# Patient Record
Sex: FEMALE | Race: BLACK OR AFRICAN AMERICAN
[De-identification: names, ages, dates, MRNs, and addresses within clinical notes are randomized per-mention and may not be internally consistent; named-entity substitution may affect disease eponyms.]

---

## 2017-03-14 ENCOUNTER — HOSPITAL ENCOUNTER (OUTPATIENT)
Dept: HOSPITAL 56 - MW.CHOBGYN | Age: 22
End: 2017-03-14
Attending: OBSTETRICS & GYNECOLOGY
Payer: COMMERCIAL

## 2017-03-14 DIAGNOSIS — Z34.90: Primary | ICD-10-CM

## 2017-04-10 ENCOUNTER — HOSPITAL ENCOUNTER (OUTPATIENT)
Dept: HOSPITAL 56 - MW.CHOBGYN | Age: 22
End: 2017-04-10
Attending: ADVANCED PRACTICE MIDWIFE
Payer: COMMERCIAL

## 2017-04-10 DIAGNOSIS — A74.9: Primary | ICD-10-CM

## 2017-05-08 ENCOUNTER — HOSPITAL ENCOUNTER (OUTPATIENT)
Dept: HOSPITAL 56 - MW.CHOBGYN | Age: 22
Discharge: HOME | End: 2017-05-08
Attending: ADVANCED PRACTICE MIDWIFE
Payer: COMMERCIAL

## 2017-05-08 DIAGNOSIS — Z34.90: Primary | ICD-10-CM

## 2017-08-02 ENCOUNTER — HOSPITAL ENCOUNTER (EMERGENCY)
Dept: HOSPITAL 56 - MW.ED | Age: 22
Discharge: HOME | End: 2017-08-02
Payer: COMMERCIAL

## 2017-08-02 VITALS — SYSTOLIC BLOOD PRESSURE: 109 MMHG | DIASTOLIC BLOOD PRESSURE: 55 MMHG

## 2017-08-02 DIAGNOSIS — O99.613: Primary | ICD-10-CM

## 2017-08-02 DIAGNOSIS — K03.81: ICD-10-CM

## 2017-08-02 DIAGNOSIS — J45.909: ICD-10-CM

## 2017-08-02 DIAGNOSIS — Z3A.37: ICD-10-CM

## 2017-08-02 DIAGNOSIS — O99.513: ICD-10-CM

## 2017-08-02 DIAGNOSIS — Z91.018: ICD-10-CM

## 2017-08-02 PROCEDURE — 99282 EMERGENCY DEPT VISIT SF MDM: CPT

## 2017-08-02 NOTE — EDM.PDOC
ED HPI GENERAL MEDICAL PROBLEM





- General


Chief Complaint: ENT Problem


Stated Complaint: JAW/TOOTH PAIN


Time Seen by Provider: 08/02/17 07:48





- History of Present Illness


INITIAL COMMENTS - FREE TEXT/NARRATIVE: 





HISTORY AND PHYSICAL:





History of present illness:


The patient is a 22-year-old female with no significant past medical history 

but who is 37 weeks pregnant with her first child and is going to be delivering 

here at our hospital with our nurse midwife; the patient presents with 

complaints of dental pain of one her right upper molar teeth. She says that in 

area has broken off due to a cavity and there is pain there. She is asking for 

a pain shot. She has not talked to her provider about pain management nor has 

she went to see a dentist. She has no facial swelling no sore throat fever 

chills nausea vomiting or other systemic complaints. She's had no vaginal 

bleeding and no abdominal pain contractions or cramping today and the baby has 

been moving.





Review of systems: 


As per history of present illness and below otherwise all systems reviewed and 

negative.





Past medical history: 


As per history of present illness and as reviewed below otherwise 

noncontributory.





Surgical history: 


As per history of present illness and as reviewed below otherwise 

noncontributory.





Social history: 


No reported history of drug or alcohol abuse.





Family history: 


As per history of present illness and as reviewed below otherwise 

noncontributory.





Physical exam:


Gen.: Well-developed well-nourished female who is visibly gravid and nontoxic


HEENT: Atraumatic, normocephalic, pupils reactive, negative for conjunctival 

pallor or scleral icterus, mucous membranes moist, throat clear, neck supple, 

nontender, trachea midline. There is an area at her right upper molar tooth 

that has a small fragment missing and there is minimal swelling in the area but 

no fluctuance and there is tenderness. There is no facial swelling and there is 

some shoddy anterior cervical adenopathy without nuchal rigidity.


Lungs: Clear to auscultation, breath sounds equal bilaterally, chest nontender.


Heart: S1S2, regular rate and rhythm no overt murmurs


Abdomen: Soft, nondistended, nontender. Gravid uterus which is nontender


Pelvis: Deferred


Genitourinary: Deferred.


Rectal: Deferred.


Extremities: Atraumatic, negative for cords or calf pain. Neurovascular 

unremarkable.


Neuro: Awake, alert, oriented. Cranial nerves II through XII unremarkable. 

Cerebellum unremarkable. Motor and sensory unremarkable throughout. Exam 

nonfocal.





Diagnostics:


Fetal heart tones=131





Therapeutics:


Dental balls





I told the patient that I would discuss pain management with the nurse midwife 

as she is close to delivery of this child and I did not want to give her any 

narcotics. She is asking for a "pain shot". I will give her dental balls and 

discuss further management with her provider.


0803: Case was discussed with Steffi Aleman the nurse midwife was aware of 

the case and agrees with the dental balls and amoxicillin prescription and says 

that she can see this patient in her clinic now to further discuss pain 

management and any other concerns.





Impression: 


Dental pain/fracture, third trimester pregnancy stable





Definitive disposition and diagnosis as appropriate pending reevaluation and 

review of above.





- Related Data


 Allergies











Allergy/AdvReac Type Severity Reaction Status Date / Time


 


nuts Allergy  throat Uncoded 02/24/17 22:07





   swelling  











Home Meds: 


 Home Meds





Fluticasone Propionate [Flonase Allergy Relief] 2 spray INH ASDIRECTED PRN 08/07 /16 [History]


Montelukast [Singulair] 10 mg PO ONETIME 08/07/16 [History]











Past Medical History





- Past Health History


Medical/Surgical History: Denies Medical/Surgical History


Other HEENT History: broken/missing teeth


Respiratory History: Reports: Asthma


OB/GYN History: Reports: Pregnancy


Psychiatric History: Reports: None





- Infectious Disease History


Infectious Disease History: Reports: Chicken Pox, Hepatitis B





Social & Family History





- Family History


Family Medical History: Noncontributory





- Tobacco Use


Smoking Status *Q: Never Smoker


Years of Tobacco use: 2


Packs/Tins Daily: 0.1


Second Hand Smoke Exposure: No





- Caffeine Use


Caffeine Use: Reports: Coffee





- Recreational Drug Use


Recreational Drug Use: No





ED ROS GENERAL





- Review of Systems


Review Of Systems: ROS reveals no pertinent complaints other than HPI.





ED EXAM, GENERAL





- Physical Exam


Exam: See Below (See dictation)





Course





- Vital Signs


Last Recorded V/S: 





 Last Vital Signs











Temp  36.6 C   08/02/17 07:45


 


Pulse  80   08/02/17 07:45


 


Resp  20   08/02/17 07:45


 


BP  125/60   08/02/17 07:45


 


Pulse Ox  99   08/02/17 07:45














- Orders/Labs/Meds


Orders: 





 Active Orders 24 hr











 Category Date Time Status


 


 Communication Order [RC] STAT Care  08/02/17 07:53 Ordered


 


 Benzocaine [Hurricaine One 20%] Med  08/02/17 08:00 Once





 2 each MUCMEM ONETIME ONE   


 


 Lidocaine 2% [Xylocaine 2% Viscous] Med  08/02/17 08:00 Once





 15 ml PO ONETIME ONE   














Departure





- Departure


Time of Disposition: 08:07


Disposition: Home, Self-Care 01


Condition: Good


Clinical Impression: 


 Pain, dental, Third trimester pregnancy








- Discharge Information


Forms:  ED Department Discharge


Additional Instructions: 


The following information is given to patients seen in the emergency department 

who are being discharged to home. This information is to outline your options 

for follow-up care. We provide all patients seen in our emergency department 

with a follow-up referral.





The need for follow-up, as well as the timing and circumstances, are variable 

depending upon the specifics of your emergency department visit.





If you don't have a primary care physician on staff, we will provide you with a 

referral. We always advise you to contact your personal physician following an 

emergency department visit to inform them of the circumstance of the visit and 

for follow-up with them and/or the need for any referrals to a consulting 

specialist.





The emergency department will also refer you to a specialist when appropriate. 

This referral assures that you have the opportunity for followup care with a 

specialist. All of these measure are taken in an effort to provide you with 

optimal care, which includes your followup.





Under all circumstances we always encourage you to contact your private 

physician who remains a resource for coordinating  your care. When calling for 

followup care, please make the office aware that this follow-up is from your 

recent emergency room visit. If for any reason you are refused follow-up, 

please contact the [facility] emergency 

department at [phone] and ask to speak to the emergency department 

charge nurse.





[facility]


Primary care-Women's Health


[address] [address] [phone]








You can use the dental balls your given today for pain as shown. A prescription 

for the amoxicillin and please go to the clinic to be seen by the nurse midwife 

now to further discuss pain management. Return to ER as needed and as discussed





- My Orders


Last 24 Hours: 





My Active Orders





08/02/17 07:53


Communication Order [RC] STAT 





08/02/17 08:00


Benzocaine [Hurricaine One 20%]   2 each MUCMEM ONETIME ONE 


Lidocaine 2% [Xylocaine 2% Viscous]   15 ml PO ONETIME ONE 














- Assessment/Plan


Last 24 Hours: 





My Active Orders





08/02/17 07:53


Communication Order [RC] STAT 





08/02/17 08:00


Benzocaine [Hurricaine One 20%]   2 each MUCMEM ONETIME ONE 


Lidocaine 2% [Xylocaine 2% Viscous]   15 ml PO ONETIME ONE

## 2017-08-23 ENCOUNTER — HOSPITAL ENCOUNTER (INPATIENT)
Dept: HOSPITAL 56 - MW.OBCHECK | Age: 22
LOS: 2 days | Discharge: HOME | DRG: 560 | End: 2017-08-25
Attending: OBSTETRICS & GYNECOLOGY | Admitting: OBSTETRICS & GYNECOLOGY
Payer: COMMERCIAL

## 2017-08-23 DIAGNOSIS — Z3A.40: ICD-10-CM

## 2017-08-23 PROCEDURE — 10907ZC DRAINAGE OF AMNIOTIC FLUID, THERAPEUTIC FROM PRODUCTS OF CONCEPTION, VIA NATURAL OR ARTIFICIAL OPENING: ICD-10-PCS | Performed by: OBSTETRICS & GYNECOLOGY

## 2017-08-23 PROCEDURE — 3E033VJ INTRODUCTION OF OTHER HORMONE INTO PERIPHERAL VEIN, PERCUTANEOUS APPROACH: ICD-10-PCS | Performed by: OBSTETRICS & GYNECOLOGY

## 2017-08-23 NOTE — PCM.LDHP
L&D History of Present Illness





- General


Date of Service: 08/23/17


Admit Problem/Dx: 


 Patient Status Order with Admit Dx/Problem





08/23/17 00:49


Patient Status [ADT] Routine 








 Admission Diagnosis/Problem











Admission Diagnosis/Problem    Pregnancy














Source of Information: Patient


History Limitations: Reports: No Limitations





- History of Present Illness


Improves with: Reports: None


Worsens with: Reports: None


Associated Symptoms: Reports: N





- Related Data


Allergies/Adverse Reactions: 


 Allergies











Allergy/AdvReac Type Severity Reaction Status Date / Time


 


nuts Allergy  throat Uncoded 02/24/17 22:07





   swelling  











Home Medications: 


 Home Meds





Fluticasone Propionate [Flonase Allergy Relief] 2 spray INH ASDIRECTED PRN 08/07 /16 [History]


Montelukast [Singulair] 10 mg PO ONETIME 08/07/16 [History]











Past Medical History





- Past Health History


Medical/Surgical History: Denies Medical/Surgical History


Other HEENT History: broken/missing teeth


Cardiovascular History: Reports: None


Respiratory History: Reports: Asthma


OB/GYN History: Reports: Pregnancy, Other (See Below)


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


Musculoskeletal History: Reports: Other (See Below)


Other Musculoskeletal History: Scoliosis


Psychiatric History: Reports: None





- Infectious Disease History


Infectious Disease History: Reports: Chicken Pox, Hepatitis B





Social & Family History





- Family History


Family Medical History: Noncontributory


Cardiac: Reports: Hypertension


: Reports: Dialysis


OBGYN: Reports: Pregnancy


Endocrine/Metabolic: Reports: Diabetes, type II


Oncologic: Reports: Breast





- Tobacco Use


Smoking Status *Q: Never Smoker


Years of Tobacco use: 2


Packs/Tins Daily: 0.1


Second Hand Smoke Exposure: No





- Caffeine Use


Caffeine Use: Reports: None





- Recreational Drug Use


Recreational Drug Use: No





H&P Review of Systems





- Review of Systems:


Review Of Systems: See Below


General: Reports: No Symptoms


HEENT: Reports: No Symptoms


Pulmonary: Reports: No Symptoms


Cardiovascular: Reports: No Symptoms


Gastrointestinal: Reports: No Symptoms


Genitourinary: Reports: No Symptoms


Musculoskeletal: Reports: No Symptoms


Skin: Reports: No Symptoms


Psychiatric: Reports: No Symptoms


Neurological: Reports: No Symptoms


Hematologic/Lymphatic: Reports: No Symptoms


Immunologic: Reports: No Symptoms





L&D Exam





- Exam


Exam: See Below





- Vital Signs


Weight: 67.6 kg





- OB Specific


Fundal Height In cm: 37


Contraction Intensity: Moderate to Strong


Fetal Movement: Active


Fetal Heart Tones: Present


Birth Presentation: Vertex





- Cazares Score


Cazares Score Cervix Position: Anterior


Cazares Score Consistency: Soft


Cazares Score Effacement: 51-70%


Cazares Score Dilation: 3-4 cm


Cazares Score Infant's Station: -2


Cazares Score Total: 9





- Exam


General: Alert, Oriented


HEENT: PERRLA, Conjunctiva Clear, EACs Clear, EOMI, Hearing Intact, Mucosa 

Moist & Pink, Nares Patent, Normal Nasal Septum, Posterior Pharynx Clear, TMs 

Clear


Neck: Supple, Trachea Midline


Lungs: Clear to Auscultation, Normal Respiratory Effort


Cardiovascular: Regular Rate, Regular Rhythm


GI/Abdominal Exam: Normal Bowel Sounds, Soft, Non-Tender, No Organomegaly, No 

Distention, No Abnormal Bruit, No Mass, Pelvis Stable


Rectal Exam: Normal Exam, Normal Rectal Tone


Genitourinary: Normal external exam, Normal bimanual exam, Normal speculum exam


Back Exam: Normal Inspection, Full Range of Motion


Extremities: Normal Inspection, Normal Range of Motion, Non-Tender, No Pedal 

Edema, Normal Capillary Refill


Skin: Warm, Dry, Intact


Neurological: Cranial Nerves Intact, Reflexes Equal Bilateral


Psychiatric: Alert, Normal Affect, Normal Mood





- Patient Data


Lab Results Last 24 hrs: 


 Laboratory Results - last 24 hr











  08/23/17 08/23/17 Range/Units





  01:19 01:19 


 


WBC  12.79 H   (4.0-11.0)  K/uL


 


RBC  3.83 L   (4.30-5.90)  M/uL


 


Hgb  11.1 L   (12.0-16.0)  g/dL


 


Hct  34.5 L   (36.0-46.0)  %


 


MCV  90.1   (80.0-98.0)  fL


 


MCH  29.0   (27.0-32.0)  pg


 


MCHC  32.2   (31.0-37.0)  g/dL


 


RDW Std Deviation  44.9   (28.0-62.0)  fl


 


RDW Coeff of Solange  14   (11.0-15.0)  %


 


Plt Count  211   (150-400)  K/uL


 


MPV  11.10   (7.40-12.00)  fL


 


Blood Type   AB POSITIVE  


 


Antibody Screen   NEGATIVE  











Result Diagrams: 


 08/23/17 01:19





Problem List Initiated/Reviewed/Updated: Yes


Orders Last 24hrs: 


 Active Orders 24 hr











 Category Date Time Status


 


 Patient Status [ADT] Routine ADT  08/23/17 00:49 Active


 


 Bedrest Bathroom Privileges [RC] ASDIRECTED Care  08/23/17 00:49 Active


 


 Communication Order [RC] ASDIRECTED Care  08/23/17 00:49 Active


 


 Communication Order [RC] ASDIRECTED Care  08/23/17 00:49 Active


 


 Communication Order [RC] ASDIRECTED Care  08/23/17 00:49 Active


 


 Fetal Heart Tones [RC] CONTINUOUS Care  08/23/17 00:49 Active


 


 Fetal Non Stress Test [RC] PER UNIT ROUTINE Care  08/23/17 00:49 Active


 


 May Shower [RC] ASDIRECTED Care  08/23/17 00:49 Active


 


 Notify Provider [RC] PRN Care  08/23/17 00:49 Active


 


 Notify Provider [RC] PRN Care  08/23/17 00:49 Active


 


 Notify Provider [RC] PRN Care  08/23/17 00:49 Active


 


 Notify Provider [RC] STAT Care  08/23/17 00:49 Active


 


 Oxygen Therapy [RC] ASDIRECTED Care  08/23/17 00:49 Active


 


 Up ad Estephania [RC] ASDIRECTED Care  08/23/17 00:49 Active


 


 Vaginal Exam [RC] PRN Care  08/23/17 00:49 Active


 


 Vaginal Exam [RC] PRN Care  08/23/17 00:49 Active


 


 Vital Signs [RC] PER UNIT ROUTINE Care  08/23/17 00:49 Active


 


 Vital Signs [RC] PER UNIT ROUTINE Care  08/23/17 00:49 Active


 


 Clear Liquid Diet [DIET] Diet  08/23/17 Breakfast Active


 


 Butorphanol [Stadol] Med  08/23/17 00:49 Active





 1 mg IVPUSH Q1H PRN   


 


 Carboprost Tromethamine [Hemabate DS] Med  08/23/17 00:49 Active





 250 mcg IM ASDIRECTED PRN   


 


 Lactated Ringers [Ringers, Lactated] 1,000 ml Med  08/23/17 01:00 Active





 IV ASDIRECTED   


 


 Lidocaine 1% [Xylocaine 1%] Med  08/23/17 00:49 Active





 50 ml INJECT .ONCE PRN   


 


 Methylergonovine [Methergine] Med  08/23/17 00:49 Active





 0.2 mg IM ASDIRECTED PRN   


 


 Misoprostol [Cytotec] Med  08/23/17 00:49 Active





 200 mcg PO .ONCE PRN   


 


 Misoprostol [Cytotec] Med  08/23/17 01:00 Active





 25 mcg VAG .ONCE   


 


 Misoprostol [Cytotec] Med  08/23/17 00:49 Active





 25 mcg VAG Q4H PRN   


 


 Oxytocin/Lactated Ringers [Pitocin in LR 30 Units/500 Med  08/23/17 01:00 

Active





 ML]   





 30 unit in 500 ml IV TITRATE   


 


 Sodium Chloride 0.9% [Saline Flush] Med  08/23/17 00:49 Active





 10 ml FLUSH ASDIRECTED PRN   


 


 Sodium Chloride 0.9% [Saline Flush] Med  08/23/17 00:49 Active





 2.5 ml FLUSH ASDIRECTED PRN   


 


 Terbutaline [Brethine] Med  08/23/17 00:49 Active





 0.25 mg SUBCUT ASDIRECTED PRN   


 


 Water For Irrigation,Sterile [Sterile Water for Med  08/23/17 00:49 Active





 Irrigation]   





 1,000 ml IRR ASDIRECTED PRN   


 


 Fetal Scalp Electrode [WOMSER] Per Unit Routine Oth  08/23/17 00:49 Ordered


 


 Medication Administration Instruction [OM.PC] Q3H Oth  08/23/17 01:00 Ordered


 


 Peripheral IV Insertion Adult [OM.PC] Routine Oth  08/23/17 00:49 Ordered


 


 Resuscitation Status Routine Resus Stat  08/23/17 00:49 Ordered








 Medication Orders





Butorphanol Tartrate (Stadol)  1 mg IVPUSH Q1H PRN


   PRN Reason: Pain


Carboprost Tromethamine (Hemabate Ds)  250 mcg IM ASDIRECTED PRN


   PRN Reason: Post Partum Hemorrhage


Lactated Ringer's (Ringers, Lactated)  1,000 mls @ 150 mls/hr IV ASDIRECTED NOLBERTO


   Last Admin: 08/23/17 02:04  Dose: 150 mls/hr


   Infusion: 08/23/17 02:04  Dose: 0 mls/hr


   Infusion: 08/23/17 02:04  Dose: 0 mls/hr


   Admin: 08/23/17 01:21  Dose: 150 mls/hr


Oxytocin/Lactated Ringer's (Pitocin In Lr 30 Units/500 Ml)  30 unit in 500 mls 

@ 2 mls/hr IV TITRATE NOLBERTO; 2 MUNITS/MIN


   PRN Reason: Protocol


   Last Titration: 08/23/17 04:34  Dose: 6 munits/min, 6 mls/hr


   Titration: 08/23/17 03:17  Dose: 4 munits/min, 4 mls/hr


   Admin: 08/23/17 02:45  Dose: 2 munits/min, 2 mls/hr


Lidocaine HCl (Xylocaine 1%)  50 ml INJECT .ONCE PRN


   PRN Reason: Laceration repair


Methylergonovine Maleate (Methergine)  0.2 mg IM ASDIRECTED PRN


   PRN Reason: Post Partum Hemorrhage


Misoprostol (Cytotec)  200 mcg PO .ONCE PRN


   PRN Reason: Post Partum Hemorrhage


Misoprostol (Cytotec)  25 mcg VAG .ONCE NOLBERTO


Misoprostol (Cytotec)  25 mcg VAG Q4H PRN


   PRN Reason: Cervical Ripening


   Stop: 08/24/17 04:50


Sodium Chloride (Saline Flush)  10 ml FLUSH ASDIRECTED PRN


   PRN Reason: Keep Vein Open


Sodium Chloride (Saline Flush)  2.5 ml FLUSH ASDIRECTED PRN


   PRN Reason: Keep Vein Open


Sterile Water (Sterile Water For Irrigation)  1,000 ml IRR ASDIRECTED PRN


   PRN Reason: delivery


Terbutaline Sulfate (Brethine)  0.25 mg SUBCUT ASDIRECTED PRN


   PRN Reason: Tacysystole








Assessment/Plan Comment:: 


Term Pregnancy admitted for elective induction she is responding to Pitocin. In 

active labor she can have epidural when she wanted

## 2017-08-24 NOTE — OR
SURGEON:

Nitesh Suh MD

 

DATE OF PROCEDURE:

 

Ms. Goyal is a 22-year-old primigravida.  She is 40+.  She is admitted for an

elective induction.  At the time of admission, she was spontaneously dilated and

she was 3 to 4 cm.  She needed Pitocin augmentation.  The patient continued to

progress well.  She had a variable deceleration throughout the process of labor

with occasional late but later on, the fetal heart tracing straightened out and

it was category 1.  The patient had epidural anesthesia for labor analgesia.

She had an artificial rupture of the membrane at 4 cm and clear fluid.  The

patient continued to progress and she became complete-complete and she was able

to accomplish a normal spontaneous vaginal delivery of male fetus.  Apgar score

reported to be 8 and 9.  The weight is not available.  The placenta delivered

spontaneous, complete, and intact.  The perineum was intact.  There is no

perineal or labial laceration.

 

ESTIMATED BLOOD LOSS:

250 to 300 mL in this birth.

 

There was no complication.

 

 

LANA / YUE

DD:  2017 21:11:00

DT:  2017 01:09:13

Job #:  [id number]

## 2017-08-24 NOTE — PCM.PNPP
- General Info


Date of Service: 17


Functional Status: Reports: Pain Controlled





- Review of Systems


General: Reports: No Symptoms


HEENT: Reports: No Symptoms


Pulmonary: Reports: No Symptoms


Cardiovascular: Reports: No Symptoms


Gastrointestinal: Reports: No Symptoms


Genitourinary: Reports: No Symptoms


Musculoskeletal: Reports: No Symptoms


Skin: Reports: No Symptoms


Neurological: Reports: No Symptoms


Psychiatric: Reports: No Symptoms





- General Info


Date of Service: 17





- Patient Data


Weight - Most Recent: 67.6 kg


Med Orders - Current: 


 Current Medications





Acetaminophen (Tylenol Extra Strength)  500 mg PO Q4H PRN


   PRN Reason: Pain


Acetaminophen (Tylenol Extra Strength)  1,000 mg PO Q4H PRN


   PRN Reason: Pain


Benzocaine/Menthol (Dermoplast Pain Relief 20%-0.5% Spray)  78 gm TOP 

ASDIRECTED PRN


   PRN Reason: Perineal Comfort Measure


Bisacodyl (Dulcolax)  10 mg RECTAL .ONCE PRN


   PRN Reason: Constipation


Butorphanol Tartrate (Stadol)  1 mg IVPUSH Q1H PRN


   PRN Reason: Pain


Carboprost Tromethamine (Hemabate Ds)  250 mcg IM ASDIRECTED PRN


   PRN Reason: Post Partum Hemorrhage


Docusate Sodium (Colace)  100 mg PO BID PRN


   PRN Reason: Constipation


Emollient Ointment (Lansinoh Hpa)  0 gm TOP ASDIRECTED PRN


   PRN Reason: Sore Nipples


Lactated Ringer's (Ringers, Lactated)  1,000 mls @ 150 mls/hr IV ASDIRECTED NOLBERTO


   Last Admin: 17 09:00 Dose:  150 mls/hr


Oxytocin/Lactated Ringer's (Pitocin In Lr 30 Units/500 Ml)  30 unit in 500 mls 

@ 2 mls/hr IV TITRATE NOLBERTO; 2 MUNITS/MIN


   PRN Reason: Protocol


   Last Titration: 17 14:55 Dose:  6 munits/min, 6 mls/hr


Ibuprofen (Motrin)  400 mg PO Q4H PRN


   PRN Reason: Pain


Ibuprofen (Motrin)  800 mg PO Q6H PRN


   PRN Reason: Pain


   Last Admin: 17 23:56 Dose:  800 mg


Lidocaine HCl (Xylocaine 1%)  50 ml INJECT .ONCE PRN


   PRN Reason: Laceration repair


Methylergonovine Maleate (Methergine)  0.2 mg IM ASDIRECTED PRN


   PRN Reason: Post Partum Hemorrhage


Misoprostol (Cytotec)  200 mcg PO .ONCE PRN


   PRN Reason: Post Partum Hemorrhage


Misoprostol (Cytotec)  25 mcg VAG .ONCE NOLBERTO


Oxycodone HCl (Oxycodone)  5 mg PO Q2H PRN


   PRN Reason: Pain


Sodium Chloride (Saline Flush)  10 ml FLUSH ASDIRECTED PRN


   PRN Reason: Keep Vein Open


Sodium Chloride (Saline Flush)  2.5 ml FLUSH ASDIRECTED PRN


   PRN Reason: Keep Vein Open


Sterile Water (Sterile Water For Irrigation)  1,000 ml IRR ASDIRECTED PRN


   PRN Reason: delivery


Terbutaline Sulfate (Brethine)  0.25 mg SUBCUT ASDIRECTED PRN


   PRN Reason: Tacysystole


Witch Hazel (Tucks)  1 pad TOP ASDIRECTED PRN


   PRN Reason: comfort care





Discontinued Medications





Bupivacaine HCl (Sensorcaine-Mpf 0.25%) Confirm Administered Dose 10 ml .ROUTE 

.STK-MED ONE


   Stop: 17 16:25


Bupivacaine HCl (Sensorcaine-Mpf 0.5%) Confirm Administered Dose 10 ml .ROUTE 

.STK-MED ONE


   Stop: 17 19:16


Fentanyl (Sublimaze) Confirm Administered Dose 100 mcg .ROUTE .STK-MED ONE


   Stop: 17 08:52


Fentanyl (Sublimaze) Confirm Administered Dose 100 mcg .ROUTE .STK-MED ONE


   Stop: 17 19:16


Oxytocin/Lactated Ringer's (Pitocin In Lr 30 Units/500 Ml)  30 unit in 500 mls 

@ 999 mls/hr IV TITRATE NOLBERTO; 999 MUNITS/MIN


   PRN Reason: Protocol


   Stop: 17 01:31


Ropivacaine/Fentanyl/NS (Fentanyl 2 Mcg-Ropiv 0.2%-Ns) Confirm Administered 

Dose 100 mls @ as directed .ROUTE .STK-MED ONE


   Stop: 17 08:51


Ropivacaine (Naropin 0.2%) Confirm Administered Dose 100 mls @ as directed 

.ROUTE .STK-MED ONE


   Stop: 17 16:25


Misoprostol (Cytotec)  25 mcg VAG Q4H PRN


   PRN Reason: Cervical Ripening


   Stop: 17 04:50


Nalbuphine HCl (Nubain)  10 mg IVPUSH Q1H PRN


   PRN Reason: Pain (severe 7-10)


   Stop: 17 02:50


Ropivacaine (Naropin 0.2%) Confirm Administered Dose 20 ml .ROUTE .STK-MED ONE


   Stop: 17 08:53











- Infant Interaction


Infant Disposition, Postpartum: [place] in Room with Family


Infant Interaction: Holding Infant


Infant Feeding: Attempted Breastfeeding; Nursed Fair/Poor


Support Person: Significant Other





- Postpartum Recovery Exam


Fundal Tone: Firm


Fundal Level: At Umbilicus


Fundal Placement: Midline


Lochia Amount: Small


Lochia Color: Rubra/Red


Perineum Description: Intact, Minimal Bruising/Swelling


Episiotomy/Laceration: None


Bladder Status: Voiding


Urinary Elimination: Voided





- Exam


General: Alert, Oriented


HEENT: Pupils Equal


Neck: Supple


Lungs: Clear to Auscultation, Normal Respiratory Effort


Cardiovascular: Regular Rate, Regular Rhythm


GI/Abdominal Exam: Normal Bowel Sounds, Soft, Non-Tender, No Organomegaly, No 

Distention, No Abnormal Bruit, No Mass, Pelvis Stable


Extremities: Normal Inspection, Normal Range of Motion, Non-Tender, No Pedal 

Edema, Normal Capillary Refill


Skin: Warm, Dry, Intact


Wound/Incisions: Healing Well


Neurological: No New Focal Deficit


Psy/Mental Status: Alert, Normal Affect, Normal Mood





- Problem List Review


Problem List Initiated/Reviewed/Updated: Yes





- My Orders


Last 24 Hours: 


My Active Orders





17 21:07


Patient Status [ADT] Routine 


May Shower [RC] ASDIRECTED 


Up ad Estephania [RC] ASDIRECTED 


Vital Signs [RC] PER UNIT ROUTINE 


Acetaminophen [Tylenol Extra Strength]   1,000 mg PO Q4H PRN 


Acetaminophen [Tylenol Extra Strength]   500 mg PO Q4H PRN 


Benzocaine/Menthol [Dermoplast Pain Relief 20%-0.5% Spray]   78 gm TOP 

ASDIRECTED PRN 


Bisacodyl [Dulcolax]   10 mg RECTAL .ONCE PRN 


Docusate Sodium [Colace]   100 mg PO BID PRN 


Ibuprofen [Motrin]   400 mg PO Q4H PRN 


Ibuprofen [Motrin]   800 mg PO Q6H PRN 


Lanolin [Lansinoh HPA]   See Dose Instructions  TOP ASDIRECTED PRN 


Witch Hazel [Tucks]   1 pad TOP ASDIRECTED PRN 


oxyCODONE   5 mg PO Q2H PRN 


Assess Lochia [WOMSER] Per Unit Routine 


Assess Uterine Involution [WOMSER] Per Unit Routine 


Peripheral IV Discontinue [OM.PC] Routine 





17 05:11


HEMOGLOBIN/HEMATOCRIT,HH [HEME] Timed 














- Plan


Plan:: 


Term Pregnancy admitted for elective induction she is responding to Pitocin. In 

active labor she can have epidural when she wanted

## 2017-08-25 VITALS — SYSTOLIC BLOOD PRESSURE: 124 MMHG | DIASTOLIC BLOOD PRESSURE: 68 MMHG

## 2017-08-25 NOTE — PCM.PNPP
- General Info


Date of Service: 17


Functional Status: Reports: Pain Controlled





- Review of Systems


General: Reports: No Symptoms


HEENT: Reports: No Symptoms


Pulmonary: Reports: No Symptoms


Cardiovascular: Reports: No Symptoms


Gastrointestinal: Reports: No Symptoms


Genitourinary: Reports: No Symptoms


Musculoskeletal: Reports: No Symptoms


Skin: Reports: No Symptoms


Neurological: Reports: No Symptoms


Psychiatric: Reports: No Symptoms





- Patient Data


Vital Signs - Most Recent: 


 Last Vital Signs











Temp  36.7 C   17 04:01


 


Pulse  80   17 04:01


 


Resp  16   17 04:01


 


BP  133/61   17 04:01


 


Pulse Ox  98   17 20:00











Weight - Most Recent: 67.6 kg


Lab Results - Last 24 Hours: 


 Laboratory Results - last 24 hr











  17 Range/Units





  05:10 


 


Hgb  11.0 L  (12.0-16.0)  g/dL


 


Hct  34.1 L  (36.0-46.0)  %











Med Orders - Current: 


 Current Medications





Acetaminophen (Tylenol Extra Strength)  500 mg PO Q4H PRN


   PRN Reason: Pain


Acetaminophen (Tylenol Extra Strength)  1,000 mg PO Q4H PRN


   PRN Reason: Pain


Benzocaine/Menthol (Dermoplast Pain Relief 20%-0.5% Spray)  78 gm TOP 

ASDIRECTED PRN


   PRN Reason: Perineal Comfort Measure


Bisacodyl (Dulcolax)  10 mg RECTAL .ONCE PRN


   PRN Reason: Constipation


Butorphanol Tartrate (Stadol)  1 mg IVPUSH Q1H PRN


   PRN Reason: Pain


Carboprost Tromethamine (Hemabate Ds)  250 mcg IM ASDIRECTED PRN


   PRN Reason: Post Partum Hemorrhage


Docusate Sodium (Colace)  100 mg PO BID PRN


   PRN Reason: Constipation


Emollient Ointment (Lansinoh Hpa)  0 gm TOP ASDIRECTED PRN


   PRN Reason: Sore Nipples


Lactated Ringer's (Ringers, Lactated)  1,000 mls @ 150 mls/hr IV ASDIRECTED NOLBERTO


   Last Admin: 17 09:00 Dose:  150 mls/hr


Oxytocin/Lactated Ringer's (Pitocin In Lr 30 Units/500 Ml)  30 unit in 500 mls 

@ 2 mls/hr IV TITRATE NOLBERTO; 2 MUNITS/MIN


   PRN Reason: Protocol


   Last Titration: 17 14:55 Dose:  6 munits/min, 6 mls/hr


Ibuprofen (Motrin)  400 mg PO Q4H PRN


   PRN Reason: Pain


Ibuprofen (Motrin)  800 mg PO Q6H PRN


   PRN Reason: Pain


   Last Admin: 17 06:38 Dose:  800 mg


Lidocaine HCl (Xylocaine 1%)  50 ml INJECT .ONCE PRN


   PRN Reason: Laceration repair


Methylergonovine Maleate (Methergine)  0.2 mg IM ASDIRECTED PRN


   PRN Reason: Post Partum Hemorrhage


Misoprostol (Cytotec)  200 mcg PO .ONCE PRN


   PRN Reason: Post Partum Hemorrhage


Misoprostol (Cytotec)  25 mcg VAG .ONCE NOLBERTO


Oxycodone HCl (Oxycodone)  5 mg PO Q2H PRN


   PRN Reason: Pain


Sodium Chloride (Saline Flush)  10 ml FLUSH ASDIRECTED PRN


   PRN Reason: Keep Vein Open


Sodium Chloride (Saline Flush)  2.5 ml FLUSH ASDIRECTED PRN


   PRN Reason: Keep Vein Open


Sterile Water (Sterile Water For Irrigation)  1,000 ml IRR ASDIRECTED PRN


   PRN Reason: delivery


Terbutaline Sulfate (Brethine)  0.25 mg SUBCUT ASDIRECTED PRN


   PRN Reason: Tacysystole


Witch Hazel (Tucks)  1 pad TOP ASDIRECTED PRN


   PRN Reason: comfort care





Discontinued Medications





Bupivacaine HCl (Sensorcaine-Mpf 0.25%) Confirm Administered Dose 10 ml .ROUTE 

.STK-MED ONE


   Stop: 17 16:25


Bupivacaine HCl (Sensorcaine-Mpf 0.5%) Confirm Administered Dose 10 ml .ROUTE 

.STK-MED ONE


   Stop: 17 19:16


Fentanyl (Sublimaze) Confirm Administered Dose 100 mcg .ROUTE .STK-MED ONE


   Stop: 17 08:52


Fentanyl (Sublimaze) Confirm Administered Dose 100 mcg .ROUTE .STK-MED ONE


   Stop: 17 19:16


Oxytocin/Lactated Ringer's (Pitocin In Lr 30 Units/500 Ml)  30 unit in 500 mls 

@ 999 mls/hr IV TITRATE NOLBERTO; 999 MUNITS/MIN


   PRN Reason: Protocol


   Stop: 17 01:31


Ropivacaine/Fentanyl/NS (Fentanyl 2 Mcg-Ropiv 0.2%-Ns) Confirm Administered 

Dose 100 mls @ as directed .ROUTE .STK-MED ONE


   Stop: 17 08:51


Ropivacaine (Naropin 0.2%) Confirm Administered Dose 100 mls @ as directed 

.ROUTE .STK-MED ONE


   Stop: 17 16:25


Misoprostol (Cytotec)  25 mcg VAG Q4H PRN


   PRN Reason: Cervical Ripening


   Stop: 17 04:50


Nalbuphine HCl (Nubain)  10 mg IVPUSH Q1H PRN


   PRN Reason: Pain (severe 7-10)


   Stop: 17 02:50


Ropivacaine (Naropin 0.2%) Confirm Administered Dose 20 ml .ROUTE .STK-MED ONE


   Stop: 17 08:53











- Infant Interaction


Infant Disposition, Postpartum: [place] in Room with Family


Infant Interaction: Holding Infant


Infant Feeding: Attempted Breastfeeding; Nursed Fair/Poor


Support Person: Significant Other





- Postpartum Recovery Exam


Fundal Tone: Firm


Fundal Level: At Umbilicus


Fundal Placement: Midline


Lochia Amount: Scant


Lochia Color: Rubra/Red


Perineum Description: Intact, Minimal Bruising/Swelling


Episiotomy/Laceration: Approximated


Bladder Status: Voiding


Urinary Elimination: Voided





- Problem List Review


Problem List Initiated/Reviewed/Updated: Yes





- My Orders


Last 24 Hours: 


My Active Orders





17 Lunch


Regular Diet [DIET] 














- Plan


Plan:: 


Term Pregnancy admitted for elective induction she is responding to Pitocin. In 

active labor she can have epidural when she wanted

## 2017-08-25 NOTE — PCM.DCSUM1
**Discharge Summary





- Discharge Data


Discharge Date: 08/25/17


Discharge Disposition: Home, Self-Care 01


Condition: Good





- Patient Instructions


Diet: Usual Diet as Tolerated


Activity: As Tolerated


Driving: Do Not Drive


Showering/Bathing: May Shower





- Discharge Plan


Home Medications: 


 Home Meds





Fluticasone Propionate [Flonase Allergy Relief] 2 spray INH ASDIRECTED PRN 08/07 /16 [History]


Montelukast [Singulair] 10 mg PO ONETIME 08/07/16 [History]








Referrals: 


[facility] [Outside]


Nitesh Suh MD [Physician] - 09/27/17 10:45 am





- General Info


Date of Service: 08/25/17


Functional Status: Reports: Pain Controlled





- Review of Systems


General: Reports: No Symptoms


HEENT: Reports: No Symptoms


Pulmonary: Reports: No Symptoms


Cardiovascular: Reports: No Symptoms


Gastrointestinal: Reports: No Symptoms


Genitourinary: Reports: No Symptoms


Musculoskeletal: Reports: No Symptoms


Skin: Reports: No Symptoms


Neurological: Reports: No Symptoms


Psychiatric: Reports: No Symptoms





- Patient Data


Vitals - Most Recent: 


 Last Vital Signs











Temp  36.7 C   08/25/17 04:01


 


Pulse  80   08/25/17 04:01


 


Resp  16   08/25/17 04:01


 


BP  133/61   08/25/17 04:01


 


Pulse Ox  98   08/24/17 20:00











Weight - Most Recent: 67.6 kg


Lab Results - Last 24 hrs: 


 Laboratory Results - last 24 hr











  08/24/17 Range/Units





  05:10 


 


Hgb  11.0 L  (12.0-16.0)  g/dL


 


Hct  34.1 L  (36.0-46.0)  %











Med Orders - Current: 


 Current Medications





Acetaminophen (Tylenol Extra Strength)  500 mg PO Q4H PRN


   PRN Reason: Pain


Acetaminophen (Tylenol Extra Strength)  1,000 mg PO Q4H PRN


   PRN Reason: Pain


Benzocaine/Menthol (Dermoplast Pain Relief 20%-0.5% Spray)  78 gm TOP 

ASDIRECTED PRN


   PRN Reason: Perineal Comfort Measure


Bisacodyl (Dulcolax)  10 mg RECTAL .ONCE PRN


   PRN Reason: Constipation


Butorphanol Tartrate (Stadol)  1 mg IVPUSH Q1H PRN


   PRN Reason: Pain


Carboprost Tromethamine (Hemabate Ds)  250 mcg IM ASDIRECTED PRN


   PRN Reason: Post Partum Hemorrhage


Docusate Sodium (Colace)  100 mg PO BID PRN


   PRN Reason: Constipation


Emollient Ointment (Lansinoh Hpa)  0 gm TOP ASDIRECTED PRN


   PRN Reason: Sore Nipples


Lactated Ringer's (Ringers, Lactated)  1,000 mls @ 150 mls/hr IV ASDIRECTED NOLBERTO


   Last Admin: 08/23/17 09:00 Dose:  150 mls/hr


Oxytocin/Lactated Ringer's (Pitocin In Lr 30 Units/500 Ml)  30 unit in 500 mls 

@ 2 mls/hr IV TITRATE NOLBERTO; 2 MUNITS/MIN


   PRN Reason: Protocol


   Last Titration: 08/23/17 14:55 Dose:  6 munits/min, 6 mls/hr


Ibuprofen (Motrin)  400 mg PO Q4H PRN


   PRN Reason: Pain


Ibuprofen (Motrin)  800 mg PO Q6H PRN


   PRN Reason: Pain


   Last Admin: 08/25/17 06:38 Dose:  800 mg


Lidocaine HCl (Xylocaine 1%)  50 ml INJECT .ONCE PRN


   PRN Reason: Laceration repair


Methylergonovine Maleate (Methergine)  0.2 mg IM ASDIRECTED PRN


   PRN Reason: Post Partum Hemorrhage


Misoprostol (Cytotec)  200 mcg PO .ONCE PRN


   PRN Reason: Post Partum Hemorrhage


Misoprostol (Cytotec)  25 mcg VAG .ONCE NOLBERTO


Oxycodone HCl (Oxycodone)  5 mg PO Q2H PRN


   PRN Reason: Pain


Sodium Chloride (Saline Flush)  10 ml FLUSH ASDIRECTED PRN


   PRN Reason: Keep Vein Open


Sodium Chloride (Saline Flush)  2.5 ml FLUSH ASDIRECTED PRN


   PRN Reason: Keep Vein Open


Sterile Water (Sterile Water For Irrigation)  1,000 ml IRR ASDIRECTED PRN


   PRN Reason: delivery


Terbutaline Sulfate (Brethine)  0.25 mg SUBCUT ASDIRECTED PRN


   PRN Reason: Tacysystole


Witch Hazel (Tucks)  1 pad TOP ASDIRECTED PRN


   PRN Reason: comfort care





Discontinued Medications





Bupivacaine HCl (Sensorcaine-Mpf 0.25%) Confirm Administered Dose 10 ml .ROUTE 

.[facility]-MED ONE


   Stop: 08/23/17 16:25


Bupivacaine HCl (Sensorcaine-Mpf 0.5%) Confirm Administered Dose 10 ml .ROUTE 

.STK-MED ONE


   Stop: 08/23/17 19:16


Fentanyl (Sublimaze) Confirm Administered Dose 100 mcg .ROUTE .STK-MED ONE


   Stop: 08/23/17 08:52


Fentanyl (Sublimaze) Confirm Administered Dose 100 mcg .ROUTE .STK-MED ONE


   Stop: 08/23/17 19:16


Oxytocin/Lactated Ringer's (Pitocin In Lr 30 Units/500 Ml)  30 unit in 500 mls 

@ 999 mls/hr IV TITRATE NOLBERTO; 999 MUNITS/MIN


   PRN Reason: Protocol


   Stop: 08/23/17 01:31


Ropivacaine/Fentanyl/NS (Fentanyl 2 Mcg-Ropiv 0.2%-Ns) Confirm Administered 

Dose 100 mls @ as directed .ROUTE .STK-MED ONE


   Stop: 08/23/17 08:51


Ropivacaine (Naropin 0.2%) Confirm Administered Dose 100 mls @ as directed 

.ROUTE .STK-MED ONE


   Stop: 08/23/17 16:25


Misoprostol (Cytotec)  25 mcg VAG Q4H PRN


   PRN Reason: Cervical Ripening


   Stop: 08/24/17 04:50


Nalbuphine HCl (Nubain)  10 mg IVPUSH Q1H PRN


   PRN Reason: Pain (severe 7-10)


   Stop: 08/23/17 02:50


Ropivacaine (Naropin 0.2%) Confirm Administered Dose 20 ml .ROUTE .STK-MED ONE


   Stop: 08/23/17 08:53











- Exam


General: Reports: Alert, Oriented


HEENT: Reports: Pupils Equal, Pupils Reactive, EOMI, Mucous Membr. Moist/Pink


Neck: Reports: Supple


Lungs: Reports: Clear to Auscultation, Normal Respiratory Effort


Cardiovascular: Reports: Regular Rate, Regular Rhythm


GI/Abdominal Exam: Normal Bowel Sounds, Soft, Non-Tender, No Organomegaly, No 

Distention, No Abnormal Bruit, No Mass, Pelvis Stable


 (Female) Exam: Normal External Exam, Normal Speculum Exam, Normal Bimanual 

Exam


Rectal (Female) Exam: Normal Exam, Normal Rectal Tone


Back Exam: Reports: Normal Inspection, Full Range of Motion


Extremities: Normal Inspection, Normal Range of Motion, Non-Tender, No Pedal 

Edema, Normal Capillary Refill


Skin: Reports: Warm, Dry, Intact


Wound/Incisions: Reports: Healing Well


Neurological: Reports: No New Focal Deficit


Psy/Mental Status: Reports: Alert, Normal Affect, Normal Mood





*Q Meaningful Use (DIS)





- VTE *Q


VTE Criteria *Q: 








- Stroke *Q


Stroke Criteria *Q: 








- AMI *Q


AMI Criteria *Q:

## 2018-01-28 ENCOUNTER — HOSPITAL ENCOUNTER (EMERGENCY)
Dept: HOSPITAL 56 - MW.ED | Age: 23
Discharge: LEFT BEFORE DISCHARGE | End: 2018-01-28
Payer: COMMERCIAL

## 2018-01-28 VITALS — DIASTOLIC BLOOD PRESSURE: 57 MMHG | SYSTOLIC BLOOD PRESSURE: 125 MMHG

## 2018-01-28 DIAGNOSIS — O03.9: Primary | ICD-10-CM

## 2018-01-28 DIAGNOSIS — Z91.018: ICD-10-CM

## 2018-01-28 NOTE — EDM.PDOC
ED HPI GENERAL MEDICAL PROBLEM





- General


Chief Complaint: OB/GYN Problem


Stated Complaint: BLOOD CLOTHS AND THROWING UP


Time Seen by Provider: 18 17:18





- History of Present Illness


INITIAL COMMENTS - FREE TEXT/NARRATIVE: 





HISTORY AND PHYSICAL:





History of present illness:


Patient's 22-year-old female presents with a concern of first trimester 

pregnancy and vaginal bleeding over last several days she's had some abdominal 

cramping denies other concern





Review of systems: 


As per history of present illness and below otherwise all systems reviewed and 

negative.





Past medical history: 


As per history of present illness and as reviewed below otherwise 

noncontributory.





Surgical history: 


As per history of present illness and as reviewed below otherwise 

noncontributory.





Social history: 


No reported history of drug or alcohol abuse.





Family history: 


As per history of present illness and as reviewed below otherwise 

noncontributory.





Physical exam:


HEENT: Atraumatic, normocephalic, pupils reactive, negative for conjunctival 

pallor or scleral icterus, mucous membranes moist, throat clear, neck supple, 

nontender, trachea midline.


Lungs: Clear to auscultation, breath sounds equal bilaterally, chest nontender.


Heart: S1S2, regular, negative for clicks, rubs, or JVD.


Abdomen: Soft, nondistended, nontender. Negative for masses or 

hepatosplenomegaly. Negative for costovertebral tenderness.


Pelvis: Stable nontender.


Genitourinary: Deferred.


Rectal: Deferred.


Extremities: Atraumatic, negative for cords or calf pain. Neurovascular 

unremarkable.


Neuro: Awake, alert, oriented. Cranial nerves II through XII unremarkable. 

Cerebellum unremarkable. Motor and sensory unremarkable throughout. Exam 

nonfocal.





Diagnostics:


CBC ABO Rh quantitative beta pelvic ultrasound





Therapeutics:


None





Impression: 


#1 vaginal bleeding #2 history of first trimester pregnancy #3 threatened 







Definitive disposition and diagnosis as appropriate pending reevaluation and 

review of above.


  ** Uterine


Pain Score (Numeric/FACES): 3





- Related Data


 Allergies











Allergy/AdvReac Type Severity Reaction Status Date / Time


 


nuts Allergy  throat Uncoded 18 17:12





   swelling  











Home Meds: 


 Home Meds





Fluticasone Propionate [Flonase Allergy Relief] 2 spray INH ASDIRECTED PRN  [History]


Montelukast [Singulair] 10 mg PO ONETIME 16 [History]











Past Medical History





- Past Health History


Medical/Surgical History: Denies Medical/Surgical History


Other HEENT History: broken/missing teeth


Cardiovascular History: Reports: None


Respiratory History: Reports: Asthma


OB/GYN History: Reports: Pregnancy, Other (See Below)


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


Musculoskeletal History: Reports: Other (See Below)


Other Musculoskeletal History: Scoliosis


Psychiatric History: Reports: None





- Infectious Disease History


Infectious Disease History: Reports: Chicken Pox, Hepatitis B





Social & Family History





- Family History


Family Medical History: Noncontributory


Cardiac: Reports: Hypertension


: Reports: Dialysis


OBGYN: Reports: Pregnancy


Endocrine/Metabolic: Reports: Diabetes, type II


Oncologic: Reports: Breast





- Tobacco Use


Smoking Status *Q: Never Smoker


Years of Tobacco use: 2


Packs/Tins Daily: 0.1


Second Hand Smoke Exposure: No





- Caffeine Use


Caffeine Use: Reports: None





- Recreational Drug Use


Recreational Drug Use: No





ED ROS GENERAL





- Review of Systems


Review Of Systems: ROS reveals no pertinent complaints other than HPI.





ED EXAM, GENERAL





- Physical Exam


Exam: See Below (See dictation)





Course





- Vital Signs


Text/Narrative:: 


Patient's emergency room course has been unremarkable patient refused vaginal 

exam and transvaginal ultrasound she did consent to transabdominal ultrasound 

and lab work ultrasound suggestive of likely complete AB quantitative beta is 19

,000 I discussed with patient the unclear clinical diagnoses at this point but 

it most likely represents incomplete  and recommended admission for 

observation patient declined to leave AMA and follow-up private medical doctor 

she is return for persistent or worsening bleeding pain as discussed





Last Recorded V/S: 


 Last Vital Signs











Temp  37.0 C   18 17:06


 


Pulse  85   18 17:06


 


Resp  18   18 17:06


 


BP  125/57 L  18 17:06


 


Pulse Ox  98   18 17:06














- Orders/Labs/Meds


Orders: 


 Active Orders 24 hr











 Category Date Time Status


 


 OB 1st Tri Sgl 1st Gest [US] Stat Exams  18 17:23 Taken


 


 CULTURE URINE [RM] Stat Lab  18 17:25 Received











Labs: 


 Laboratory Tests











  18 Range/Units





  17:25 17:32 17:32 


 


WBC   8.84   (4.0-11.0)  K/uL


 


RBC   4.07 L   (4.30-5.90)  M/uL


 


Hgb   13.0   (12.0-16.0)  g/dL


 


Hct   37.0   (36.0-46.0)  %


 


MCV   90.9   (80.0-98.0)  fL


 


MCH   31.9   (27.0-32.0)  pg


 


MCHC   35.1   (31.0-37.0)  g/dL


 


RDW Std Deviation   41.9   (28.0-62.0)  fl


 


RDW Coeff of Solange   13   (11.0-15.0)  %


 


Plt Count   237   (150-400)  K/uL


 


MPV   10.10   (7.40-12.00)  fL


 


Neut % (Auto)   71.3   (48.0-80.0)  %


 


Lymph % (Auto)   21.3   (16.0-40.0)  %


 


Mono % (Auto)   3.8   (0.0-15.0)  %


 


Eos % (Auto)   3.1   (0.0-7.0)  %


 


Baso % (Auto)   0.5   (0.0-1.5)  %


 


Neut # (Auto)   6.3 H   (1.4-5.7)  K/uL


 


Lymph # (Auto)   1.9   (0.6-2.4)  K/uL


 


Mono # (Auto)   0.3   (0.0-0.8)  K/uL


 


Eos # (Auto)   0.3   (0.0-0.7)  K/uL


 


Baso # (Auto)   0.0   (0.0-0.1)  K/uL


 


Nucleated RBC %   0.0   /100WBC


 


Nucleated RBCs #   0   K/uL


 


HCG, Quant    75763.1  mIU/mL


 


Urine Color  RED    


 


Urine Appearance  BLOODY    


 


Urine pH  6.0    (5.0-8.0)  


 


Ur Specific Gravity  >= 1.030    (1.001-1.035)  


 


Urine Protein  100    (NEGATIVE)  mg/dL


 


Urine Glucose (UA)  NEGATIVE    (NEGATIVE)  mg/dL


 


Urine Ketones  NEGATIVE    (NEGATIVE)  mg/dL


 


Urine Occult Blood  LARGE H    (NEGATIVE)  


 


Urine Nitrite  NEGATIVE    (NEGATIVE)  


 


Urine Bilirubin  NEGATIVE    (NEGATIVE)  


 


Urine Urobilinogen  0.2    (<2.0)  EU/dL


 


Ur Leukocyte Esterase  TRACE    (NEGATIVE)  


 


Urine RBC  TOO NUMEROUS TO CT    (0-2/HPF)  


 


Urine WBC  1-2    (0-5/HPF)  


 


Ur Epithelial Cells  FEW    (NONE-FEW)  


 


Urine Bacteria  FEW    (NEGATIVE)  


 


Blood Type     














  18 Range/Units





  17:32 


 


WBC   (4.0-11.0)  K/uL


 


RBC   (4.30-5.90)  M/uL


 


Hgb   (12.0-16.0)  g/dL


 


Hct   (36.0-46.0)  %


 


MCV   (80.0-98.0)  fL


 


MCH   (27.0-32.0)  pg


 


MCHC   (31.0-37.0)  g/dL


 


RDW Std Deviation   (28.0-62.0)  fl


 


RDW Coeff of Solange   (11.0-15.0)  %


 


Plt Count   (150-400)  K/uL


 


MPV   (7.40-12.00)  fL


 


Neut % (Auto)   (48.0-80.0)  %


 


Lymph % (Auto)   (16.0-40.0)  %


 


Mono % (Auto)   (0.0-15.0)  %


 


Eos % (Auto)   (0.0-7.0)  %


 


Baso % (Auto)   (0.0-1.5)  %


 


Neut # (Auto)   (1.4-5.7)  K/uL


 


Lymph # (Auto)   (0.6-2.4)  K/uL


 


Mono # (Auto)   (0.0-0.8)  K/uL


 


Eos # (Auto)   (0.0-0.7)  K/uL


 


Baso # (Auto)   (0.0-0.1)  K/uL


 


Nucleated RBC %   /100WBC


 


Nucleated RBCs #   K/uL


 


HCG, Quant   mIU/mL


 


Urine Color   


 


Urine Appearance   


 


Urine pH   (5.0-8.0)  


 


Ur Specific Gravity   (1.001-1.035)  


 


Urine Protein   (NEGATIVE)  mg/dL


 


Urine Glucose (UA)   (NEGATIVE)  mg/dL


 


Urine Ketones   (NEGATIVE)  mg/dL


 


Urine Occult Blood   (NEGATIVE)  


 


Urine Nitrite   (NEGATIVE)  


 


Urine Bilirubin   (NEGATIVE)  


 


Urine Urobilinogen   (<2.0)  EU/dL


 


Ur Leukocyte Esterase   (NEGATIVE)  


 


Urine RBC   (0-2/HPF)  


 


Urine WBC   (0-5/HPF)  


 


Ur Epithelial Cells   (NONE-FEW)  


 


Urine Bacteria   (NEGATIVE)  


 


Blood Type  AB POSITIVE  














Departure





- Departure


Time of Disposition: 18:39


Disposition: Against Medical Advice 07


Condition: Good


Clinical Impression: 


 Complete 








- Discharge Information


Referrals: 


Steffi Aleman CNM [Primary Care Provider] - 


Forms:  ED Department Discharge


Additional Instructions: 


The following information is given to patients seen in the emergency department 

who are being discharged to home. This information is to outline your options 

for follow-up care. We provide all patients seen in our emergency department 

with a follow-up referral.





The need for follow-up, as well as the timing and circumstances, are variable 

depending upon the specifics of your emergency department visit.





If you don't have a primary care physician on staff, we will provide you with a 

referral. We always advise you to contact your personal physician following an 

emergency department visit to inform them of the circumstance of the visit and 

for follow-up with them and/or the need for any referrals to a consulting 

specialist.





The emergency department will also refer you to a specialist when appropriate. 

This referral assures that you have the opportunity for followup care with a 

specialist. All of these measure are taken in an effort to provide you with 

optimal care, which includes your followup.





Under all circumstances we always encourage you to contact your private 

physician who remains a resource for coordinating  your care. When calling for 

followup care, please make the office aware that this follow-up is from your 

recent emergency room visit. If for any reason you are refused follow-up, 

please contact the [facility] emergency department at [phone] 

and asked to speak to the emergency department charge nurse.


























Follow-up OB GYN ASAP return as needed as discussed





- My Orders


Last 24 Hours: 


My Active Orders





18 17:23


OB 1st Tri Sgl 1st Gest [US] Stat 





18 17:25


CULTURE URINE [RM] Stat 














- Assessment/Plan


Last 24 Hours: 


My Active Orders





18 17:23


OB 1st Tri Sgl 1st Gest [US] Stat 





18 17:25


CULTURE URINE [RM] Stat

## 2018-01-29 NOTE — US
EXAM DATE: 18



PATIENT'S AGE: 22





Patient: SWETHA HEBERT



Facility: [place], [place]

Patient ID: [id number]

Site Patient ID: [id number].

Site Accession #: [id number].

: 1995

Study: US OB Pelvis [id number]/28/2018 6:06:09 PM

Ordering Physician: Daniel Bishop



Final Report: 

INDICATION:

Bleeding and passing large clots for 2 days. Approximately 10-11 weeks pregnant 
by dates.



TECHNIQUE:

Transabdominal pelvic ultrasound with grayscale and color Doppler imaging. 
Patient declined transvaginal portion of the examination.



COMPARISON:

None.



FINDINGS:



Uterus is 12.5 x 5.4 x 7.8 cm. No discrete uterine mass. The endometrial stripe 
measures 17 mm. No living intrauterine pregnancy. Right ovary is 5.2 x 1.1 x 3 
cm and is within normal limits. Left ovary is 3.8 x 1.9 x 3.3 cm and is within 
normal limits. Normal appearing color Doppler flow in the bilateral ovaries. No 
pelvic free fluid. 



IMPRESSION:



No living intrauterine pregnancy. Mild thickening of the endometrial stripe 
likely represents sequela of recent spontaneous .





Dictated by Mat Russo MD @ 2018 6:11:35 PM





Dictated by: Mat Russo MD @ 2018 18:11:55

(Electronic Signature)



Report Signed by Proxy.
[facility] HospitalKELLY

## 2019-01-29 ENCOUNTER — HOSPITAL ENCOUNTER (EMERGENCY)
Dept: HOSPITAL 56 - MW.ED | Age: 24
Discharge: HOME | End: 2019-01-29
Payer: COMMERCIAL

## 2019-01-29 VITALS — SYSTOLIC BLOOD PRESSURE: 106 MMHG | DIASTOLIC BLOOD PRESSURE: 47 MMHG

## 2019-01-29 DIAGNOSIS — Z91.018: ICD-10-CM

## 2019-01-29 DIAGNOSIS — L73.9: Primary | ICD-10-CM

## 2019-01-29 NOTE — EDM.PDOC
ED HPI GENERAL MEDICAL PROBLEM





- General


Chief Complaint: Bite:Animal, Insect


Stated Complaint: BIT ON FACE


Time Seen by Provider: 01/29/19 11:14


Source of Information: Reports: Patient


History Limitations: Reports: No Limitations





- History of Present Illness


INITIAL COMMENTS - FREE TEXT/NARRATIVE: 


HISTORY AND PHYSICAL:





History of present illness:


Patient is a 23-year-old female who presents to the emergency room with 

complaints of a possible bug bite to her upper forehead along her hairline. She 

states that she noticed these bumps yesterday and now feels like she has some 

fullness in her left ear. States she washed her hair this morning and did not 

notice any irritation or bumps in her actual scalp. She is concerned that there 

may be a bug inside her left ear. She denies any fevers, chills, chest pain, 

shortness of breath or cough. Denies any abdominal pain, nausea, vomiting, 

diarrhea, constipation or dysuria. Denies any recent chemical products/

treatments of her hair.





Review of systems: 


As per history of present illness and below otherwise all systems reviewed and 

negative.





Past medical history: 


As per history of present illness and as reviewed below otherwise 

noncontributory.





Surgical history: 


As per history of present illness and as reviewed below otherwise 

noncontributory.





Social history: 


See social history for further information





Family history: 


As per history of present illness and as reviewed below otherwise 

noncontributory.





Physical exam:


General: Well-developed and well-nourished 23-year-old -American female. 

Alert and oriented. Nontoxic appearing and in no acute distress.


HEENT: Atraumatic, normocephalic, pupils equal and reactive bilaterally, 

negative for conjunctival pallor or scleral icterus, mucous membranes moist, 

TMs normal bilaterally, throat clear, neck supple, nontender, trachea midline. 

No drooling or trismus noted. No meningeal signs. No hot potato voice noted. 


Lungs: Clear to auscultation, breath sounds equal bilaterally, chest nontender.


Heart: S1S2, regular rate and rhythm without overt murmur


Abdomen: Soft, nondistended, nontender. Negative for masses or 

hepatosplenomegaly. Negative for costovertebral tenderness.


Pelvis: Stable nontender.


Genitourinary: Deferred.


Rectal: Deferred.


Skin: Small pustule folliculitis-appearing area to the upper left scalp along 

her hairline. Otherwise skin is intact, warm, dry. No lesions or rashes noted.


Extremities: Atraumatic, negative for cords or calf pain. Neurovascular 

unremarkable.


Neuro: Awake, alert, oriented. Cranial nerves II through XII unremarkable. 

Cerebellum unremarkable. Motor and sensory unremarkable throughout. Exam 

nonfocal.





Diagnostics:


None





Therapeutics:


None





Prescription:


Bactroban





Impression: 


Folliculitis





Plan:


1. Wash area twice daily with warm water and gentle soap. Keep clean and dry.


2. Apply the Bactroban ointment 3 times daily over the next 7-10 days.


3. Follow-up with your primary care provider and/or a dermatologist in the next 

1-2 days. Return to the ED as needed and as discussed.





Definitive disposition and diagnosis as appropriate pending reevaluation and 

review of above.








- Related Data


 Allergies











Allergy/AdvReac Type Severity Reaction Status Date / Time


 


apple Allergy  Hives Verified 01/29/19 11:25


 


nuts Allergy  throat Uncoded 01/28/18 17:12





   swelling  











Home Meds: 


 Home Meds





. [No Known Home Meds]  01/29/19 [History]











Past Medical History





- Past Health History


Medical/Surgical History: Denies Medical/Surgical History


Other HEENT History: broken/missing teeth


Cardiovascular History: Reports: None


Respiratory History: Reports: Asthma


OB/GYN History: Reports: Pregnancy, Other (See Below)


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


Musculoskeletal History: Reports: Other (See Below)


Other Musculoskeletal History: Scoliosis


Psychiatric History: Reports: None





- Infectious Disease History


Infectious Disease History: Reports: Chicken Pox, Hepatitis B





Social & Family History





- Family History


Family Medical History: Noncontributory


Cardiac: Reports: Hypertension


: Reports: Dialysis


OBGYN: Reports: Pregnancy


Endocrine/Metabolic: Reports: Diabetes, type II


Oncologic: Reports: Breast





- Caffeine Use


Caffeine Use: Reports: None





ED ROS GENERAL





- Review of Systems


Review Of Systems: ROS reveals no pertinent complaints other than HPI.





ED EXAM, ANIMAL BITE





- Physical Exam


Exam: See Below (See dictation)





Course





- Vital Signs


Last Recorded V/S: 


 Last Vital Signs











Temp  97.4 F   01/29/19 11:26


 


Pulse  84   01/29/19 11:44


 


Resp  16   01/29/19 11:44


 


BP  106/47 L  01/29/19 11:44


 


Pulse Ox  98   01/29/19 11:44














Departure





- Departure


Time of Disposition: 11:30


Disposition: Home, Self-Care 01


Clinical Impression: 


 Folliculitis








- Discharge Information


Instructions:  Folliculitis


Referrals: 


Steffi Aleman CNM [Primary Care Provider] - 


Forms:  ED Department Discharge


Additional Instructions: 


The following information is given to patients seen in the emergency department 

who are being discharged to home. This information is to outline your options 

for follow-up care. We provide all patients seen in our emergency department 

with a follow-up referral.





The need for follow-up, as well as the timing and circumstances, are variable 

depending upon the specifics of your emergency department visit.





If you don't have a primary care physician on staff, we will provide you with a 

referral. We always advise you to contact your personal physician following an 

emergency department visit to inform them of the circumstance of the visit and 

for follow-up with them and/or the need for any referrals to a consulting 

specialist.





The emergency department will also refer you to a specialist when appropriate. 

This referral assures that you have the opportunity for follow-up care with a 

specialist. All of these measure are taken in an effort to provide you with 

optimal care, which includes your follow-up.





Under all circumstances we always encourage you to contact your private 

physician who remains a resource for coordinating your care. When calling for 

follow-up care, please make the office aware that this follow-up is from your 

recent emergency room visit. If for any reason you are refused follow-up, 

please contact the  Emergency 

Department at [phone] and asked to speak to the emergency department 

charge nurse.








Primary Care


[address] [place] [id number]


Phone: [phone]


Fax: [phone]





[address] [address]


Phone: [phone]


Fax: [phone]





1. Wash area twice daily with warm water and gentle soap. Keep clean and dry.


2. Apply the Bactroban ointment 3 times daily over the next 7-10 days.


3. Follow-up with your primary care provider and/or a dermatologist in the next 

1-2 days. Return to the ED as needed and as discussed.

## 2019-02-16 ENCOUNTER — HOSPITAL ENCOUNTER (EMERGENCY)
Dept: HOSPITAL 56 - MW.ED | Age: 24
Discharge: HOME | End: 2019-02-16
Payer: COMMERCIAL

## 2019-02-16 VITALS — DIASTOLIC BLOOD PRESSURE: 74 MMHG | SYSTOLIC BLOOD PRESSURE: 118 MMHG

## 2019-02-16 DIAGNOSIS — Z91.018: ICD-10-CM

## 2019-02-16 DIAGNOSIS — J02.9: Primary | ICD-10-CM

## 2019-02-16 NOTE — EDM.PDOC
ED HPI GENERAL MEDICAL PROBLEM





- General


Chief Complaint: ENT Problem


Stated Complaint: CONGESTION/SWOLLEN GLANDS


Time Seen by Provider: 02/16/19 22:56


Source of Information: Reports: Patient





- History of Present Illness


INITIAL COMMENTS - FREE TEXT/NARRATIVE: 





HISTORY AND PHYSICAL:


History of present illness:


[Sore throat for 5 days increasing in severity no muffled voice drooling or 

trismus





Denies fever nausea vomiting chills sweats





]


Review of systems: 


As per history of present illness and below otherwise all systems reviewed and 

negative.


Past medical history: 


As per history of present illness and as reviewed below otherwise 

noncontributory.


Surgical history: 


As per history of present illness and as reviewed below otherwise 

noncontributory.


Social history: 


No reported history of drug or alcohol abuse.


Family history: 


As per history of present illness and as reviewed below otherwise 

noncontributory.


Physical exam:


HEENT: Atraumatic, normocephalic, pupils reactive, negative for conjunctival 

pallor or scleral icterus, mucous membranes moist, throat clear, neck supple, 

nontender, trachea midline. other erythema no exudates


Lungs: Clear to auscultation, breath sounds equal bilaterally, chest nontender.


Heart: S1S2, regular, negative for clicks, rubs, or JVD.


Abdomen: Soft, nondistended, nontender. Negative for masses or 

hepatosplenomegaly. Negative for costovertebral tenderness.


Pelvis: Stable nontender.


Genitourinary: Deferred.


Rectal: Deferred.


Extremities: Atraumatic, negative for cords or calf pain. Neurovascular 

unremarkable.


Neuro: Awake, alert, oriented. Cranial nerves II through XII unremarkable. 

Cerebellum unremarkable. Motor and sensory unremarkable throughout. Exam 

nonfocal.


Diagnostics:


[Influenza/strep


]


Therapeutics:


[ amoxicillin 1


]


Impression: 


[ pharyngitis ]


Definitive disposition and diagnosis as appropriate pending reevaluation and 

review of above.


  ** throat


Pain Score (Numeric/FACES): 6





- Related Data


 Allergies











Allergy/AdvReac Type Severity Reaction Status Date / Time


 


apple Allergy  Hives Verified 02/16/19 22:56


 


nuts Allergy  throat Uncoded 02/16/19 22:56





   swelling  











Home Meds: 


 Home Meds





. [No Known Home Meds]  01/29/19 [History]











Past Medical History





- Past Health History


Medical/Surgical History: Denies Medical/Surgical History


Other HEENT History: broken/missing teeth


Cardiovascular History: Reports: None


Respiratory History: Reports: Asthma


OB/GYN History: Reports: Pregnancy, Other (See Below)


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


Musculoskeletal History: Reports: Other (See Below)


Other Musculoskeletal History: Scoliosis


Psychiatric History: Reports: None





- Infectious Disease History


Infectious Disease History: Reports: Chicken Pox, Hepatitis B





Social & Family History





- Family History


Family Medical History: Noncontributory


Cardiac: Reports: Hypertension


: Reports: Dialysis


OBGYN: Reports: Pregnancy


Endocrine/Metabolic: Reports: Diabetes, type II


Oncologic: Reports: Breast





- Caffeine Use


Caffeine Use: Reports: None





ED ROS GENERAL





- Review of Systems


Review Of Systems: See Below





ED EXAM, GENERAL





- Physical Exam


Exam: See Below





Course





- Vital Signs


Last Recorded V/S: 


 Last Vital Signs











Temp  97.9 F   02/16/19 22:55


 


Pulse  80   02/16/19 22:55


 


Resp  18   02/16/19 22:55


 


BP  118/74   02/16/19 22:55


 


Pulse Ox  95   02/16/19 22:55














- Orders/Labs/Meds


Orders: 


 Active Orders 24 hr











 Category Date Time Status


 


 CULTURE STREP A CONFIRMATION [RM] Stat Lab  02/16/19 23:00 Results


 


 STREP SCRN A RAPID W CULT CONF [RM] Stat Lab  02/16/19 23:00 Results














Departure





- Departure


Time of Disposition: 23:33


Disposition: Home, Self-Care 01


Condition: Good


Clinical Impression: 


 Pharyngitis








- Discharge Information


Forms:  ED Department Discharge


Additional Instructions: 


The following information is given to patients seen in the emergency department 

who are being discharged to home. This information is to outline your options 

for follow-up care. We provide all patients seen in our emergency department 

with a follow-up referral.





The need for follow-up, as well as the timing and circumstances, are variable 

depending upon the specifics of your emergency department visit.





If you don't have a primary care physician on staff, we will provide you with a 

referral. We always advise you to contact your personal physician following an 

emergency department visit to inform them of the circumstance of the visit and 

for follow-up with them and/or the need for any referrals to a consulting 

specialist.





The emergency department will also refer you to a specialist when appropriate. 

This referral assures that you have the opportunity for follow-up care with a 

specialist. All of these measure are taken in an effort to provide you with 

optimal care, which includes your follow-up.





Under all circumstances we always encourage you to contact your private 

physician who remains a resource for coordinating your care. When calling for 

follow-up care, please make the office aware that this follow-up is from your 

recent emergency room visit. If for any reason you are refused follow-up, 

please contact the [facility] emergency department at [phone] 

and asked to speak to the emergency department charge nurse.








- My Orders


Last 24 Hours: 


My Active Orders





02/16/19 23:00


CULTURE STREP A CONFIRMATION [RM] Stat 


STREP SCRN A RAPID W CULT CONF [RM] Stat 














- Assessment/Plan


Last 24 Hours: 


My Active Orders





02/16/19 23:00


CULTURE STREP A CONFIRMATION [RM] Stat 


STREP SCRN A RAPID W CULT CONF [RM] Stat

## 2020-01-20 NOTE — EDM.PDOC
ED HPI GENERAL MEDICAL PROBLEM





- General


Source of Information: Reports: Patient


History Limitations: Reports: No Limitations





<Donna Carmona - Last Filed: 20 07:09>





<Wolf Yoon - Last Filed: 20 08:52>





- General


Chief Complaint: OB/GYN Problem


Stated Complaint: 10 WKS PREG- BLEEDING, HEADACHE


Time Seen by Provider: 20 05:50





- History of Present Illness


INITIAL COMMENTS - FREE TEXT/NARRATIVE: 


HISTORY OF PRESENT ILLNESS:  Patient is a  (A1) at 10 weeks by dates which 

she states was confirmed by prior ultrasound (but we are unable to obtain the 

report) who presents to the ED with vaginal spotting since yesterday.  Has mild 

low back pain.  No abdominal pain.  Denies history of STIs.  Denies any 

dysuria.  Has mild headache, not first or worst headache, not maximal or sudden 

in onset.  Denies any chest pain dyspnea or syncope.  No rash.








REVIEW OF SYSTEMS:  Other than the symptoms associated with the present events, 

the following is reported with regard to recent health:  


General:  (-) fever.  


HENT:  (-) congestion. 


 Respiratory:  (-) cough.  


Cardiovascular:  (-) chest pain.  


GI:  (-) abdominal pain.  


:  (-) urinary complaints. 


 Musculoskeletal:  (-) other aches or pains. 


 Endocrine:  (-) generalized weakness.  


Neurological:  (-) localized weakness.  


Skin: (-) rash








 PAST MEDICAL HISTORY: reviewed as per nursing notes


 SOCIAL HISTORY:  reviewed as per nursing notes,


 MEDICATIONS:  Per nurse's note


 ALLERGIES:  Per nurse's note, reviewed by me 














PHYSICAL EXAMINATION:


 GENERALIZED APPEARANCE: well developed, well nourished in no distress


 VITAL SIGNS:  Per nurse's note, reviewed by me 


 SKIN:  Warm, dry; (-) cyanosis; (-) rash.


 HEAD:  (-) scalp swelling, (-) tenderness.


 EYES:  (-) conjunctival pallor, (-) scleral icterus.


 ENMT:   (-) stridor; mucous membranes moist.


 NECK:  (-) tenderness, (-) stiffness,


 CHEST AND RESPIRATORY:  (-) rales, (-) rhonchi, (-) wheezes; breath sounds 

equal bilaterally.


 HEART AND CARDIOVASCULAR:  (-) irregularity; (-) murmur, (-) gallop.


 ABDOMEN AND GI:  Soft; (-) tenderness, (-) guarding, (-) rebound, (-) palpable 

masses,


: mild vaginal bleeding from closed cervical os. no CMT or adnexal palpable 

masses, tenderness. 


 EXTREMITIES:  (-) deformity, (-) edema.  


 NEURO AND PSYCH: Alert.  Cranial nerves grossly intact; strength symmetric. 

gait steady


   


 DIAGNOSTICS:


Ultrasound ordered


CBC, hcg and ABO/Rh ordered














EMERGENCY DEPARTMENT COURSE AND TREATMENT:  Patient's condition remained stable 

during Emergency Department evaluation. Care transferred to Dr. Yoon pending 

above diagnostics and disposition, likely discharge to home. 





 (Donna Carmona)


Patient is 24-year-old female signed out from overnight team presenting with 

threatened miscarriage.  Patient had ultrasound which was done in the emergency 

department which shows approximately 10-week old fetus with a heart rate within 

normal limits.  Patient does not require RhoGam and patient instructed to follow

-up with OB/GYN.  Patient given education regarding pelvic rest and strict 

return precautions.  All questions addressed and answered.  Patient agrees with 

plan


 (Wolf Yoon)





- Related Data


 Allergies











Allergy/AdvReac Type Severity Reaction Status Date / Time


 


apple Allergy  Hives Verified 20 05:55


 


nuts Allergy  throat Uncoded 20 05:55





   swelling  











Home Meds: 


 Home Meds





Ondansetron [Zofran ODT] 4 mg PO ASDIRECTED 20 [History]











Past Medical History





- Past Health History


Medical/Surgical History: Denies Medical/Surgical History


Other HEENT History: broken/missing teeth


Cardiovascular History: Reports: None


Respiratory History: Reports: Asthma


OB/GYN History: Reports: Pregnancy, Other (See Below)


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


Musculoskeletal History: Reports: Other (See Below)


Other Musculoskeletal History: Scoliosis


Neurological History: Reports: None


Psychiatric History: Reports: None


Dermatologic History: Reports: None





- Infectious Disease History


Infectious Disease History: Reports: Chicken Pox





<Donan Carmona - Last Filed: 20 07:09>





Social & Family History





- Family History


Family Medical History: Noncontributory


Cardiac: Reports: Hypertension


: Reports: Dialysis


OBGYN: Reports: Pregnancy


Endocrine/Metabolic: Reports: Diabetes, type II


Oncologic: Reports: Breast





- Tobacco Use


Smoking Status *Q: Never Smoker





- Caffeine Use


Caffeine Use: Reports: None





- Recreational Drug Use


Recreational Drug Use: No





<Donna Carmona - Last Filed: 20 07:09>





ED ROS GENERAL





- Review of Systems


Review Of Systems: See Below (see dictation)





<Donna Carmona - Last Filed: 20 07:09>





ED EXAM PREGNANCY





- Physical Exam


Exam: See Below (see dictation)





<Donna Carmona - Last Filed: 20 07:09>





- Vital Signs


Last Recorded V/S: 





 Last Vital Signs











Temp  36.6 C   20 05:56


 


Pulse  92   20 05:56


 


Resp  18   20 05:56


 


BP  114/70   20 05:56


 


Pulse Ox  97   20 05:56














- Orders/Labs/Meds


Orders: 





 Active Orders 24 hr











 Category Date Time Status


 


 Pelvic Exam, Set Up [RC] ASDIRECTED Care  20 06:27 Active











Labs: 





 Laboratory Tests











  20 Range/Units





  06:00 06:55 06:55 


 


WBC   10.50   (4.0-11.0)  K/uL


 


RBC   4.44   (4.30-5.90)  M/uL


 


Hgb   14.1   (12.0-16.0)  g/dL


 


Hct   41.8   (36.0-46.0)  %


 


MCV   94.1   (80.0-98.0)  fL


 


MCH   31.8   (27.0-32.0)  pg


 


MCHC   33.7   (31.0-37.0)  g/dL


 


RDW Std Deviation   41.7   (28.0-62.0)  fl


 


RDW Coeff of Solange   12   (11.0-15.0)  %


 


Plt Count   259   (150-400)  K/uL


 


MPV   10.30   (7.40-12.00)  fL


 


Neut % (Auto)   74.6   (48.0-80.0)  %


 


Lymph % (Auto)   19.0   (16.0-40.0)  %


 


Mono % (Auto)   4.1   (0.0-15.0)  %


 


Eos % (Auto)   2.0   (0.0-7.0)  %


 


Baso % (Auto)   0.3   (0.0-1.5)  %


 


Neut # (Auto)   7.8 H   (1.4-5.7)  K/uL


 


Lymph # (Auto)   2.0   (0.6-2.4)  K/uL


 


Mono # (Auto)   0.4   (0.0-0.8)  K/uL


 


Eos # (Auto)   0.2   (0.0-0.7)  K/uL


 


Baso # (Auto)   0.0   (0.0-0.1)  K/uL


 


Nucleated RBC %   0.0   /100WBC


 


Nucleated RBCs #   0   K/uL


 


HCG, Quant    190821.0  mIU/mL


 


Urine Color  YELLOW    


 


Urine Appearance  CLOUDY    


 


Urine pH  6.0    (5.0-8.0)  


 


Ur Specific Gravity  >= 1.030    (1.001-1.035)  


 


Urine Protein  NEGATIVE    (NEGATIVE)  mg/dL


 


Urine Glucose (UA)  NEGATIVE    (NEGATIVE)  mg/dL


 


Urine Ketones  NEGATIVE    (NEGATIVE)  mg/dL


 


Urine Occult Blood  LARGE H    (NEGATIVE)  


 


Urine Nitrite  NEGATIVE    (NEGATIVE)  


 


Urine Bilirubin  NEGATIVE    (NEGATIVE)  


 


Urine Urobilinogen  1.0    (<2.0)  EU/dL


 


Ur Leukocyte Esterase  NEGATIVE    (NEGATIVE)  


 


Urine RBC  12-15    (0-2/HPF)  


 


Urine WBC  1-3    (0-5/HPF)  


 


Ur Epithelial Cells  MANY    (NONE-FEW)  


 


Urine Bacteria  FEW    (NEGATIVE)  


 


Urine Mucus  LIGHT    (NONE-MOD)  


 


Blood Type     


 


Antibody Screen     














  20 Range/Units





  06:55 08:07 


 


WBC    (4.0-11.0)  K/uL


 


RBC    (4.30-5.90)  M/uL


 


Hgb    (12.0-16.0)  g/dL


 


Hct    (36.0-46.0)  %


 


MCV    (80.0-98.0)  fL


 


MCH    (27.0-32.0)  pg


 


MCHC    (31.0-37.0)  g/dL


 


RDW Std Deviation    (28.0-62.0)  fl


 


RDW Coeff of Solange    (11.0-15.0)  %


 


Plt Count    (150-400)  K/uL


 


MPV    (7.40-12.00)  fL


 


Neut % (Auto)    (48.0-80.0)  %


 


Lymph % (Auto)    (16.0-40.0)  %


 


Mono % (Auto)    (0.0-15.0)  %


 


Eos % (Auto)    (0.0-7.0)  %


 


Baso % (Auto)    (0.0-1.5)  %


 


Neut # (Auto)    (1.4-5.7)  K/uL


 


Lymph # (Auto)    (0.6-2.4)  K/uL


 


Mono # (Auto)    (0.0-0.8)  K/uL


 


Eos # (Auto)    (0.0-0.7)  K/uL


 


Baso # (Auto)    (0.0-0.1)  K/uL


 


Nucleated RBC %    /100WBC


 


Nucleated RBCs #    K/uL


 


HCG, Quant    mIU/mL


 


Urine Color    


 


Urine Appearance    


 


Urine pH    (5.0-8.0)  


 


Ur Specific Gravity    (1.001-1.035)  


 


Urine Protein    (NEGATIVE)  mg/dL


 


Urine Glucose (UA)    (NEGATIVE)  mg/dL


 


Urine Ketones    (NEGATIVE)  mg/dL


 


Urine Occult Blood    (NEGATIVE)  


 


Urine Nitrite    (NEGATIVE)  


 


Urine Bilirubin    (NEGATIVE)  


 


Urine Urobilinogen    (<2.0)  EU/dL


 


Ur Leukocyte Esterase    (NEGATIVE)  


 


Urine RBC    (0-2/HPF)  


 


Urine WBC    (0-5/HPF)  


 


Ur Epithelial Cells    (NONE-FEW)  


 


Urine Bacteria    (NEGATIVE)  


 


Urine Mucus    (NONE-MOD)  


 


Blood Type  AB POSITIVE  AB POSITIVE  


 


Antibody Screen   NEGATIVE  














Departure





- Departure


Condition: Good





- Discharge Information


*PRESCRIPTION DRUG MONITORING PROGRAM REVIEWED*: Not Applicable


*COPY OF PRESCRIPTION DRUG MONITORING REPORT IN PATIENT CARLO: Not Applicable





<Donna Carmona - Last Filed: 20 07:09>





- Departure


Time of Disposition: 08:51





<Wolf Yoon - Last Filed: 20 08:52>





- Departure


Disposition: Home, Self-Care 01


Clinical Impression: 


 Threatened miscarriage








- Discharge Information


Instructions:  Threatened Miscarriage, Easy-to-Read, Vaginal Bleeding During 

Pregnancy, First Trimester, Easy-to-Read


Referrals: 


Steffi Aleman CNM [[facility]Wife] - 1 Day


Forms:  ED Department Discharge


Additional Instructions: 


The following information is given to patients seen in the emergency department 

who are being discharged to home. This information is to outline your options 

for follow-up care. We provide all patients seen in our emergency department 

with a follow-up referral.





The need for follow-up, as well as the timing and circumstances, are variable 

depending upon the specifics of your emergency department visit.





If you don't have a primary care physician on staff, we will provide you with a 

referral. We always advise you to contact your personal physician following an 

emergency department visit to inform them of the circumstance of the visit and 

for follow-up with them and/or the need for any referrals to a consulting 

specialist.





The emergency department will also refer you to a specialist when appropriate. 

This referral assures that you have the opportunity for follow-up care with a 

specialist. All of these measure are taken in an effort to provide you with 

optimal care, which includes your follow-up.





Under all circumstances we always encourage you to contact your private 

physician who remains a resource for coordinating your care. When calling for 

follow-up care, please make the office aware that this follow-up is from your 

recent emergency room visit. If for any reason you are refused follow-up, 

please contact the [facility] Emergency 

Department at [phone] and asked to speak to the emergency department 

charge nurse.








Sepsis Event Note





- Evaluation


Sepsis Screening Result: No Definite Risk





- Focused Exam


Date Exam was Performed: 20


Time Exam was Performed: 07:09





<Donna Carmona - Last Filed: 20 07:09>





- Focused Exam


Date Exam was Performed: 20


Time Exam was Performed: 08:51





<Wolf Yoon - Last Filed: 20 08:52>





- Focused Exam


Vital Signs: 





 Vital Signs











  Temp Pulse Resp BP Pulse Ox


 


 20 05:56  36.6 C  92  18  114/70  97

## 2020-01-20 NOTE — US
1st trimester obstetrical ultrasound: Multiple real-time images were 

obtained transvaginally.

 

Comparison: No prior study for current pregnancy.

 

Dates:

Current ultrasound: URSZULA 08/14/20, gestational age 10 weeks 3 days

 

Single intrauterine gestation is seen.  Amniotic fluid volume is 

normal.  Embryo is present.  No subchorionic hemorrhage is seen.  

Right and left adnexa appear within normal limits.

 

Measurements:

Crown-rump length: 3.57 cm - 10 weeks 3 days

Heart rate: 151 BPM

 

Impression:

1.  Single intrauterine gestation.  Dates as noted above.

2.  No complicating process is seen by ultrasound at this time.

 

Diagnostic code #1

 

This report was dictated in [place] Standard Time

## 2020-03-01 NOTE — EDM.PDOC
ED HPI GENERAL MEDICAL PROBLEM





- General


Chief Complaint: ENT Problem


Stated Complaint: ABCESS TOOTH X2


Time Seen by Provider: 20 17:48


Source of Information: Reports: Patient


History Limitations: Reports: No Limitations





- History of Present Illness


INITIAL COMMENTS - FREE TEXT/NARRATIVE: 


HISTORY AND PHYSICAL:





History of present illness:


Patient is a 24-year-old female who presents to the emergency room with 

complaints of known dental abscess.  She states that she has had chronic 

problems with her posterior molars and last month did have one partially 

removed.  She states that the dentist had left a piece in and they recommended 

that she follow-up to have this removed.  She states she did not want to attend 

that dental office and does have an upcoming appointment in [place]  She is 

currently 4 months pregnant and does see Steffi Aleman at the clinic.  She 

has not had any OB related concerns.  She states that her OB/GYN prescribed her 

pen VK although she confesses she has not been taking this as prescribed.  She 

states she has been taking this "more as needed".  She is here today requesting 

to be placed on amoxicillin as she states this is worked well for her in the 

past.  Patient denies any fever, chills, headache, change in vision, syncope or 

near syncope. Denies any chest pain, back pain, shortness of breath or cough. 

Denies any abdominal pain, nausea, vomiting, diarrhea, constipation or dysuria. 

Has not noted any blood in urine or stool. No OB related complaints. Patient 

has been eating and drinking appropriately.





Review of systems: 


As per history of present illness and below otherwise all systems reviewed and 

negative.





Past medical history: 


As per history of present illness and as reviewed below otherwise 

noncontributory.





Surgical history: 


As per history of present illness and as reviewed below otherwise 

noncontributory.





Social history: 


See social history for further information





Family history: 


As per history of present illness and as reviewed below otherwise 

noncontributory.





Physical exam:


General: Well-developed and well-nourished 24-year-old -American female.

  Alert and oriented.  Nontoxic-appearing and in no acute distress.


HEENT: Atraumatic, normocephalic, pupils equal and reactive bilaterally, 

negative for conjunctival pallor or scleral icterus, mucous membranes moist, 

mild erythema noted to bilateral posterior molars along the lower jawline, TMs 

normal bilaterally, throat clear, neck supple, nontender, trachea midline. No 

drooling or trismus noted. No meningeal signs. No hot potato voice noted. 


Lungs: Clear to auscultation, breath sounds equal bilaterally, chest nontender.


Heart: S1S2, regular rate and rhythm without overt murmur


Abdomen: Soft, , nontender. Negative for costovertebral tenderness.


Skin: Intact, warm, dry. No lesions or rashes noted.


Extremities: Atraumatic, moves all extremities per self without difficulty or 

deficits, negative for cords or calf pain. Neurovascular unremarkable.


Neuro: Awake, alert, oriented. Cranial nerves II through XII unremarkable. 

Cerebellum unremarkable. Motor and sensory unremarkable throughout. Exam 

nonfocal.





Notes:


Augmentin is safe in pregnancy.  Steffi Aleman is currently not on call.  I 

did encourage her to call on Monday if she is requiring further pain 

management.  Supportive care measures were reviewed and discussed. Voices 

understanding and is agreeable to plan of care. Denies any further questions or 

concerns at this time.





Diagnostics:


None





Therapeutics:


Dental Balls, Augmentin





Prescription:


Augmentin





Impression: 


Dental Abscess





Plan:


1. Please take the antibiotic as prescribed, Augmentin 875mg twice daily x 7 

days.


2. Tylenol as needed for pain management. "Tooth Balls" have been given to you; 

apply along the gumline every 2-3 hours as needed. Do not swallow these; 

external use only. 


3. Follow-up with a dentist in [place] for definitive care. Return to the ED as 

needed and as discussed.





Definitive disposition and diagnosis as appropriate pending reevaluation and 

review of above.





  ** Oral/Mouth


Pain Score (Numeric/FACES): 4





- Related Data


 Allergies











Allergy/AdvReac Type Severity Reaction Status Date / Time


 


apple Allergy  Hives Verified 20 17:50


 


nuts Allergy  throat Uncoded 20 17:50





   swelling  











Home Meds: 


 Home Meds





Ondansetron [Zofran ODT] 4 mg PO ASDIRECTED 20 [History]


Amoxicillin/Clavulanate K [Augmentin 875-125 MG] 1 tab PO BID 7 Days #14 tablet 

20 [Rx]











Past Medical History





- Past Health History


Medical/Surgical History: Denies Medical/Surgical History


Other HEENT History: broken/missing teeth


Cardiovascular History: Reports: None


Respiratory History: Reports: Asthma


OB/GYN History: Reports: Pregnancy, Other (See Below)


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


Musculoskeletal History: Reports: Other (See Below)


Other Musculoskeletal History: Scoliosis


Neurological History: Reports: None


Psychiatric History: Reports: None


Dermatologic History: Reports: None





- Infectious Disease History


Infectious Disease History: Reports: Chicken Pox





Social & Family History





- Family History


Family Medical History: Noncontributory


Cardiac: Reports: Hypertension


: Reports: Dialysis


OBGYN: Reports: Pregnancy


Endocrine/Metabolic: Reports: Diabetes, type II


Oncologic: Reports: Breast





- Caffeine Use


Caffeine Use: Reports: None





ED ROS ENT





- Review of Systems


Review Of Systems: Comprehensive ROS is negative, except as noted in HPI.





ED EXAM, ENT





- Physical Exam


Exam: See Below (See dictation)





Course





- Vital Signs


Last Recorded V/S: 


 Last Vital Signs











Temp  97.6 F   20 17:50


 


Pulse  96   20 17:50


 


Resp  17   20 17:50


 


BP  117/64   20 17:50


 


Pulse Ox  97   20 17:50














- Orders/Labs/Meds


Meds: 


Medications














Discontinued Medications














Generic Name Dose Route Start Last Admin





  Trade Name Kurtisq  PRN Reason Stop Dose Admin


 


Amoxicillin/Clavulanate Potassium  1 tab  20 18:09  





  Augmentin 875 Mg/125 Mg  PO  20 18:10  





  ONETIME ONE   





     





     





     





     


 


Benzocaine  2 each  20 17:57  





  Hurricaine One 20%  MUCMEM  20 17:58  





  ONETIME ONE   





     





     





     





     


 


Lidocaine HCl  15 ml  20 17:57  





  Xylocaine 2% Viscous  PO  20 17:58  





  ONETIME ONE   





     





     





     





     














Departure





- Departure


Time of Disposition: 18:09


Disposition: Home, Self-Care 01


Clinical Impression: 


 Dental abscess








- Discharge Information


Prescriptions: 


Amoxicillin/Clavulanate K [Augmentin 875-125 MG] 1 tab PO BID 7 Days #14 tablet


Instructions:  Dental Abscess, Easy-to-Read


Referrals: 


PCP,None [Primary Care Provider] - 


Forms:  ED Department Discharge


Additional Instructions: 


The following information is given to patients seen in the emergency department 

who are being discharged to home. This information is to outline your options 

for follow-up care. We provide all patients seen in our emergency department 

with a follow-up referral.





The need for follow-up, as well as the timing and circumstances, are variable 

depending upon the specifics of your emergency department visit.





If you don't have a primary care physician on staff, we will provide you with a 

referral. We always advise you to contact your personal physician following an 

emergency department visit to inform them of the circumstance of the visit and 

for follow-up with them and/or the need for any referrals to a consulting 

specialist.





The emergency department will also refer you to a specialist when appropriate. 

This referral assures that you have the opportunity for follow-up care with a 

specialist. All of these measure are taken in an effort to provide you with 

optimal care, which includes your follow-up.





Under all circumstances we always encourage you to contact your private 

physician who remains a resource for coordinating your care. When calling for 

follow-up care, please make the office aware that this follow-up is from your 

recent emergency room visit. If for any reason you are refused follow-up, 

please contact the [facility] Emergency 

Department at [phone] and asked to speak to the emergency department 

charge nurse.





[facility]


Primary Care


[address] [place] [address]


Phone: [phone]


Fax: [phone]





[place] [address]


Phone: [phone]


Fax: [phone]





1. Please take the antibiotic as prescribed, Augmentin 875mg twice daily x 7 

days.


2. Tylenol as needed for pain management. "Tooth Balls" have been given to you; 

apply along the gumline every 2-3 hours as needed. Do not swallow these; 

external use only. 


3. Follow-up with a dentist in [place] for definitive care. Return to the ED as 

needed and as discussed.





Sepsis Event Note





- Focused Exam


Vital Signs: 


 Vital Signs











  Temp Pulse Resp BP Pulse Ox


 


 20 17:50  97.6 F  96  17  117/64  97











Date Exam was Performed: 20


Time Exam was Performed: 18:10

## 2021-03-13 NOTE — PCM.PRNOTE
- Free Text/Narrative


Note: 





Anes Note





Epidural infusion has completed. A new infusion of 100 cc 0.2% ropivicaine wiht 

1 mcg cc fetnayl added was placed.





Rate is 8 cc hr with 6 cc q 20 min prn bolus. 





Time with patient [id number]





Cuauhtemoc Betts CRNA

## 2021-03-13 NOTE — PCM.PRNOTE
- Free Text/Narrative


Note: 





Anes NOte





Patietn requests epidural for L&D. Sitting position, levle L3-L4 midline 

approach. Sterile technique. Chloraprep scrub to lumbar area.





Sterile fenestrated drape applied. Epidural space easily achieved single attempt

using LUIS A technique. LUIS A at 3 cm. Cath threaded 5 cm with ease. Cath secured at 

skin using sterile clear adhesive dressing. 





0345 Test 3 cc 1.5% lido with epi negative.





0347 Load 10 cc 0.2% ropivicaine with 1 mcg cc fentanyl added in sloe divided 

doses.





0351 Pump started with 90 cc same solution. Rate is 8 cc hr wiht 6 cc q 20 min 

prn bolus.





Prashant well.





Time wiht patient [id number].





Cuauhtemoc Betts CRNA

## 2021-03-13 NOTE — OR
SURGEON:

John Cooley MD

 

DATE OF PROCEDURE:  2021

 

INDICATIONS FOR PROCEDURE:

A 25-year-old G3, P1-0-1-1 at 40 weeks and 3 days, presenting in early labor.

The patient reports she had small gushes of fluid starting around midnight the

day before, but did not have a large gush or contractions.  Therefore, she did

not come in to be evaluated.  She started feeling  mild-to-moderate contractions

that are regular for the past few hours.  After arriving in Labor and Delivery,

she was found to be 4 cm dilated with bulging membranes, but did have a positive

AmniSure confirming rupture of membranes.  She is GBS positive.  Ampicillin was

started for GBS prophylaxis.  She had otherwise an uncomplicated pregnancy,

had prenatal care with  at [facility].  

The patient received an epidural for pain control.  She continued to contract 

regularly and make cervical change on her own. The baby was category 1 tracing 

and reassuring.  After about 4 hours, she progressed to 7 cm.  The bulging 

membrane was ruptured and clear fluid was noted.  She then progressed to 

9/90/+1 in the next 3 hours with an anterior lip that was reducible. She was 
feeling 

pressure, and started pushing with contractions.

 

PREOPERATIVE DIAGNOSIS:

[place] intrauterine pregnancy at 40 weeks and 3 days.

 

POSTOPERATIVE DIAGNOSIS:

[place] intrauterine pregnancy at 40 weeks and 3 days.

 

PROCEDURES PERFORMED:

Normal spontaneous vaginal delivery, repair of second-degree lacerations.

 

ANESTHESIOLOGIST:

Dr. Roblero.

 

ANESTHESIA:

Epidural.

 

FINDINGS:

Viable female infant, Apgar score of 5 and 9, weight of 8 pounds.  Baby had a

tight nuchal cord x1 and a body cord.  The baby did have thin meconium stained

 fluid and terminal meconium at delivery.  The baby's head was asynclitic and 
delivered

ROT.

 

ESTIMATED BLOOD LOSS:

300 mL.

 

DESCRIPTION OF PROCEDURE:

The patient pushed with contractions, initially was not making a lot of

progress, and the fetal head was asynclitic and ROP presentation.  Therefore,

multiple positions were used to help with pushing efforts including on her back,

hands and knees, and using the pushing bar.  The baby did have some

variable and early decels, and a prolonged decels to the 70s lasting 2

to 3 minutes that responded after stopping pushing and repositioning.  After

about 2-1/2 hours, the baby was .  The baby delivered in ROT

presentation.  Anterior shoulder delivered easily followed by posterior shoulder

and remaining body.  There was a tight nuchal cord and a body cord which was

reduced after delivery.  The baby did not initially cry, but was moving all

extremities and slightly pale.  After stimulation, the baby did start crying

within the next minute.  The baby was placed on maternal chest and evaluated by

awaiting nursing staff.  Terminal meconium was noted. The umbilical cord was 
clamped

and cut after 30 seconds and no longer pulsating.  Umbilical cord gases were

obtained.  The placenta was removed with gentle traction on the umbilical cord

and fundal massage.  Moderate amount of blood was expressed with improvement 

in uterine tone.  Perineum and vagina were carefully

examined.  She was noted to have a second-degree laceration.  She did not have

complete pain control with the epidural.  Therefore, 15 mL of 1% lidocaine with

epi was injected for local anesthesia.  A 3-0 Vicryl was used to repair the

second-degree laceration in usual fashion.  Hemostasis was confirmed.  Rectal

exam was performed and no sphincter injury was noted.  The patient tolerated the

procedure well and was given postpartum care instructions.

 

 

DADA TURNER

DD:  2021 13:56:37

DT:  2021 14:29:55

Job #:  [id number]

[facility]

## 2021-03-13 NOTE — PCM.PREANE
Preanesthetic Assessment





- Anesthesia/Transfusion/Family Hx


Anesthesia History: Prior Anesthesia Without Reaction


Family History of Anesthesia Reaction: No





- Physical Assessment


NPO Status Date: 03/13/21


NPO Status Time: 00:05


Height: 1.55 m


Weight: 78.471 kg


ASA Class: 2





- Lab


Values: 





                             Laboratory Last Values











WBC  8.39 K/uL (4.0-11.0)   03/13/21  02:13    


 


RBC  3.96 M/uL (4.30-5.90)  L  03/13/21  02:13    


 


Hgb  12.8 g/dL (12.0-16.0)   03/13/21  02:13    


 


Hct  38.3 % (36.0-46.0)   03/13/21  02:13    


 


MCV  96.7 fL (80.0-98.0)   03/13/21  02:13    


 


MCH  32.3 pg (27.0-32.0)  H  03/13/21  02:13    


 


MCHC  33.4 g/dL (31.0-37.0)   03/13/21  02:13    


 


RDW Std Deviation  44.6 fl (28.0-62.0)   03/13/21  02:13    


 


RDW Coeff of Solange  13 % (11.0-15.0)   03/13/21  02:13    


 


Plt Count  149 K/uL (150-400)  L  03/13/21  02:13    


 


MPV  12.50 fL (7.40-12.00)  H  03/13/21  02:13    


 


Nucleated RBC %  0.0 /100WBC  03/13/21  02:13    


 


Nucleated RBCs #  0 K/uL  03/13/21  02:13    


 


Urine Color  YELLOW   03/13/21  02:20    


 


Urine Appearance  SLT CLOUDY   03/13/21  02:20    


 


Urine pH  6.0  (5.0-8.0)   03/13/21  02:20    


 


Ur Specific Gravity  1.020  (1.001-1.035)   03/13/21  02:20    


 


Urine Protein  NEGATIVE mg/dL (NEGATIVE)   03/13/21  02:20    


 


Urine Glucose (UA)  NEGATIVE mg/dL (NEGATIVE)   03/13/21  02:20    


 


Urine Ketones  TRACE mg/dL (NEGATIVE)  H  03/13/21  02:20    


 


Urine Occult Blood  LARGE  (NEGATIVE)  H  03/13/21  02:20    


 


Urine Nitrite  NEGATIVE  (NEGATIVE)   03/13/21  02:20    


 


Urine Bilirubin  NEGATIVE  (NEGATIVE)   03/13/21  02:20    


 


Urine Urobilinogen  1.0 EU/dL (<2.0)   03/13/21  02:20    


 


Ur Leukocyte Esterase  TRACE  (NEGATIVE)  H  03/13/21  02:20    


 


Fetal Membrane Rupture  POSITIVE   03/13/21  02:00    


 


SARS-CoV-2 RNA (VIMAL)  NEGATIVE  (NEGATIVE)   03/13/21  02:25    


 


Blood Type  AB POSITIVE   03/13/21  02:13    


 


Antibody Screen  NEGATIVE   03/13/21  02:13    














- Allergies


Allergies/Adverse Reactions: 


                                    Allergies











Allergy/AdvReac Type Severity Reaction Status Date / Time


 


apple Allergy  Hives Verified 03/01/20 17:50


 


nuts Allergy  throat Uncoded 03/01/20 17:50





   swelling  














- Acknowledgements


Anesthesia Type Planned: Epidural


Pt an Appropriate Candidate for the Planned Anesthesia: Yes


Alternatives and Risks of Anesthesia Discussed w Pt/Guardian: Yes


Pt/Guardian Understands and Agrees with Anesthesia Plan: Yes





PreAnesthesia Questionnaire





- Past Health History


Medical/Surgical History: Denies Medical/Surgical History


Other HEENT History: broken/missing teeth


Cardiovascular History: Reports: None


Respiratory History: Reports: Asthma


OB/GYN History: Reports: Pregnancy, Other (See Below)


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


Musculoskeletal History: Reports: Other (See Below)


Other Musculoskeletal History: Scoliosis


Neurological History: Reports: None


Psychiatric History: Reports: None


Dermatologic History: Reports: None





- Infectious Disease History


Infectious Disease History: Reports: Chicken Pox





- HOME MEDS


Home Medications: 


                                    Home Meds





Ondansetron [Zofran ODT] 4 mg PO ASDIRECTED 01/20/20 [History]


Amoxicillin/Clavulanate K [Augmentin 875-125 MG] 1 tab PO BID 7 Days #14 tablet 

03/01/20 [Rx]











- CURRENT (IN HOUSE) MEDS


Current Meds: 





                               Current Medications





Butorphanol Tartrate (Butorphanol 1 Mg/Ml Sdv)  1 mg IVPUSH Q1H PRN


   PRN Reason: Pain


Carboprost Tromethamine (Carboprost Tromethamine 250 Mcg/1 Ml Amp)  250 mcg IM 

ASDIRECTED PRN


   PRN Reason: Post Partum Hemorrhage


Oxytocin/Sodium Chloride (Oxytocin 30 Unit/500 Ml-Ns)  30 unit in 500 mls @ 999 

mls/hr IV TITRATE Pending sale to [facility]


Tranexamic Acid 1,000 mg/ (Sodium Chloride)  110 mls @ 660 mls/hr IV ONETIME PRN


   PRN Reason: Bleeding


Lactated Ringer's (Ringers, Lactated)  1,000 mls @ 150 mls/hr IV ASDIRECTED Pending sale to [facility]


   Last Admin: 03/13/21 02:15 Dose:  999 mls/hr


   Documented by: 


Ampicillin Sodium 1 gm/ Sodium (Chloride)  50 mls @ 100 mls/hr IV Q4H Pending sale to [facility]


Lidocaine HCl (Lidocaine 1% 50 Ml Mdv)  50 ml INJECT ONETIME PRN


   PRN Reason: Laceration repair


Methylergonovine Maleate (Methylergonovine 0.2 Mg/1 Ml Amp)  0.2 mg IM 

ASDIRECTED PRN


   PRN Reason: Post Partum Hemorrhage


Misoprostol (Misoprostol 200 Mcg Tab)  200 mcg PO ONETIME PRN


   PRN Reason: Post Partum Hemorrhage


Nalbuphine HCl (Nalbuphine 10 Mg/1 Ml Vial)  10 mg IVPUSH Q1H PRN


   PRN Reason: Pain (severe 7-10)


Sodium Chloride (Sodium Chloride 0.9% 10 Ml Syringe)  10 ml FLUSH ASDIRECTED PRN


   PRN Reason: Keep Vein Open


Sodium Chloride (Sodium Chloride 0.9% 2.5 Ml Syringe)  2.5 ml FLUSH ASDIRECTED 

PRN


   PRN Reason: Keep Vein Open


Sodium Chloride (Sodium Chloride 0.9% 10 Ml Sdv)  10 ml IV ASDIRECTED PRN


   PRN Reason: IV Use


Sterile Water (Water For Irrigation,Sterile 1,000 Ml Container)  1,000 ml IRR 

ASDIRECTED PRN


   PRN Reason: delivery





Discontinued Medications





Fentanyl (Fentanyl 100 Mcg/2 Ml Sdv) Confirm Administered Dose 100 mcg .ROUTE 

.STK-MED ONE


   Stop: 03/13/21 03:30


Ampicillin Sodium 2 gm/ Sodium (Chloride)  100 mls @ 200 mls/hr IV ONETIME ONE


   Stop: 03/13/21 02:37


   Last Admin: 03/13/21 03:01 Dose:  200 mls/hr


   Documented by: 


Ropivacaine (Naropin 0.2%) Confirm Administered Dose 100 mls @ as directed 

.ROUTE .STK-MED ONE


   Stop: 03/13/21 03:30

## 2021-03-14 NOTE — [id number]
Post Anesthesia Note





- EVALUATION WITHIN 48HRS OF ANESTHETIC


Vital Signs in Normal Range: Yes


Patient Participated in Evaluation: Yes


Respiratory Function Stable: Yes


Airway Patent: Yes


Cardiovascular Function Stable: Yes


Hydration Status Stable: Yes


Pain Control Satisfactory: Yes


Nausea and Vomiting Control Satisfactory: Yes


Mental Status Recovered: Yes


Vital Signs: 


                                Last Vital Signs











Temp  36.3 C   03/14/21 04:30


 


Pulse  64   03/14/21 04:30


 


Resp  18   03/14/21 04:30


 


BP  116/64   03/14/21 04:30


 


Pulse Ox  98   03/14/21 04:30

## 2021-03-14 NOTE — PCM.PNPP
- General Info


Date of Service: 21


Functional Status: Reports: Pain Controlled, Tolerating Diet, Ambulating, 

Urinating





- Review of Systems


General: Reports: No Symptoms


HEENT: Reports: No Symptoms


Pulmonary: Reports: No Symptoms


Cardiovascular: Reports: No Symptoms


Gastrointestinal: Reports: No Symptoms


Genitourinary: Reports: No Symptoms


Musculoskeletal: Reports: No Symptoms


Skin: Reports: No Symptoms


Neurological: Reports: No Symptoms


Psychiatric: Reports: No Symptoms





- General Info


Date of Service: 21





- Patient Data


Vital Signs - Most Recent: 


                                Last Vital Signs











Temp  96.9 F   21 07:43


 


Pulse  68   21 07:43


 


Resp  16   21 07:43


 


BP  121/73   21 07:43


 


Pulse Ox  100   21 07:43











Weight - Most Recent: 173 lb


Lab Results - Last 24 Hours: 


                         Laboratory Results - last 24 hr











  21 Range/Units





  12:27 06:00 


 


Hgb   11.0 L  (12.0-16.0)  g/dL


 


Hct   33.0 L  (36.0-46.0)  %


 


Cord ABG pH  7.260   (7.18-7.38)  


 


Cord ABG Base Excess  -5   (-10--2)  


 


Cord VBG pH  7.334   (7.25-7.45)  


 


Cord VBG Base Excess  -6   (-10--2)  











Med Orders - Current: 


                               Current Medications





Acetaminophen (Acetaminophen 500 Mg Tab)  500 mg PO Q4H PRN


   PRN Reason: Pain


Acetaminophen (Acetaminophen 500 Mg Tab)  1,000 mg PO Q4H PRN


   PRN Reason: Pain


   Last Admin: 21 06:30 Dose:  1,000 mg


   Documented by: 


Benzocaine/Menthol (Benzocaine/Menthol 20%-0.5% Spray 78 Gm Cannister)  78 gm 

TOP ASDIRECTED PRN


   PRN Reason: Perineal Comfort Measure


   Last Admin: 21 14:31 Dose:  1 bottle


   Documented by: 


Bisacodyl (Bisacodyl 10 Mg Supp)  10 mg RECTAL ONETIME PRN


   PRN Reason: Constipation


Docusate Sodium (Docusate Sodium 100 Mg Cap)  100 mg PO BID PRN


   PRN Reason: Constipation


   Last Admin: 21 08:01 Dose:  100 mg


   Documented by: 


Emollient Ointment (Lanolin 100% Cream 7 Gm Tube)  0 gm TOP ASDIRECTED PRN


   PRN Reason: Sore Nipples


   Last Admin: 21 14:31 Dose:  1 applic


   Documented by: 


Ibuprofen (Ibuprofen 400 Mg Tab)  400 mg PO Q4H PRN


   PRN Reason: Pain


Ibuprofen (Ibuprofen 800 Mg Tab)  800 mg PO Q6H PRN


   PRN Reason: Pain


   Last Admin: 21 08:01 Dose:  800 mg


   Documented by: 


Oxycodone HCl (Oxycodone 5 Mg Tab)  5 mg PO Q2H PRN


   PRN Reason: Pain


Sodium Chloride (Sodium Chloride 0.9% 10 Ml Syringe)  10 ml FLUSH ASDIRECTED PRN


   PRN Reason: Keep Vein Open


Sodium Chloride (Sodium Chloride 0.9% 2.5 Ml Syringe)  2.5 ml FLUSH ASDIRECTED 

PRN


   PRN Reason: Keep Vein Open


Sodium Chloride (Sodium Chloride 0.9% 10 Ml Sdv)  10 ml IV ASDIRECTED PRN


   PRN Reason: IV Use


Witch Hazel (Witch Hazel Medicated Pads 40/Jar)  1 pad TOP ASDIRECTED PRN


   PRN Reason: comfort care


   Last Admin: 21 14:31 Dose:  1 box


   Documented by: 





Discontinued Medications





Ampicillin Sodium (Ampicillin 1 Gm Vial) Confirm Administered Dose 1 gm .ROUTE 

.STK-MED ONE


   Stop: 21 10:39


   Last Admin: 21 07:19 Dose:  Not Given


   Documented by: 


Butorphanol Tartrate (Butorphanol 1 Mg/Ml Sdv)  1 mg IVPUSH Q1H PRN


   PRN Reason: Pain


Carboprost Tromethamine (Carboprost Tromethamine 250 Mcg/1 Ml Amp)  250 mcg IM 

ASDIRECTED PRN


   PRN Reason: Post Partum Hemorrhage


Fentanyl (Fentanyl 100 Mcg/2 Ml Sdv) Confirm Administered Dose 100 mcg .ROUTE 

.STK-MED ONE


   Stop: 21 03:30


   Last Admin: 21 07:19 Dose:  Not Given


   Documented by: 


Fentanyl (Fentanyl 100 Mcg/2 Ml Sdv) Confirm Administered Dose 100 mcg .ROUTE 

.STK-MED ONE


   Stop: 21 12:20


   Last Admin: 21 07:19 Dose:  Not Given


   Documented by: 


Oxytocin/Sodium Chloride (Oxytocin 30 Unit/500 Ml-Ns)  30 unit in 500 mls @ 999 

mls/hr IV TITRATE Pending sale to [facility]


   Last Admin: 21 12:27 Dose:  500 mls/hr


   Documented by: 


Tranexamic Acid 1,000 mg/ (Sodium Chloride)  110 mls @ 660 mls/hr IV ONETIME PRN


   PRN Reason: Bleeding


Ampicillin Sodium 2 gm/ Sodium (Chloride)  100 mls @ 200 mls/hr IV ONETIME ONE


   Stop: 21 02:37


   Last Admin: 21 03:01 Dose:  200 mls/hr


   Documented by: 


Lactated Ringer's (Ringers, Lactated)  1,000 mls @ 150 mls/hr IV ASDIRECTED Pending sale to [facility]


   Last Admin: 21 08:12 Dose:  150 mls/hr


   Documented by: 


Ampicillin Sodium 1 gm/ Sodium (Chloride)  50 mls @ 100 mls/hr IV Q4H Pending sale to [facility]


   Last Admin: 21 10:53 Dose:  100 mls/hr


   Documented by: 


Ropivacaine (Naropin 0.2%) Confirm Administered Dose 100 mls @ as directed 

.ROUTE .STK-MED ONE


   Stop: 21 03:30


   Last Admin: 21 07:19 Dose:  Not Given


   Documented by: 


Sodium Chloride (Normal Saline) Confirm Administered Dose 50 mls @ as directed 

.ROUTE .STK-MED ONE


   Stop: 21 10:39


   Last Admin: 21 07:19 Dose:  Not Given


   Documented by: 


Ropivacaine (Naropin 0.2%) Confirm Administered Dose 100 mls @ as directed 

.ROUTE .STK-MED ONE


   Stop: 21 12:20


   Last Admin: 21 07:19 Dose:  Not Given


   Documented by: 


Lidocaine HCl (Lidocaine 1% 50 Ml Mdv)  50 ml INJECT ONETIME PRN


   PRN Reason: Laceration repair


   Last Admin: 21 13:38 Dose:  50 ml


   Documented by: 


Methylergonovine Maleate (Methylergonovine 0.2 Mg/1 Ml Amp)  0.2 mg IM 

ASDIRECTED PRN


   PRN Reason: Post Partum Hemorrhage


Misoprostol (Misoprostol 200 Mcg Tab)  200 mcg PO ONETIME PRN


   PRN Reason: Post Partum Hemorrhage


Nalbuphine HCl (Nalbuphine 10 Mg/1 Ml Vial)  10 mg IVPUSH Q1H PRN


   PRN Reason: Pain (severe 7-10)


Sterile Water (Water For Irrigation,Sterile 1,000 Ml Container)  1,000 ml IRR 

ASDIRECTED PRN


   PRN Reason: delivery











- Infant Interaction


Infant Disposition, Postpartum:  to Nursery


Infant Feeding: Attempted Breastfeeding; Nursed Fair/Poor


Support Person: Significant Other





- Postpartum Recovery Exam


Fundal Tone: Firm


Fundal Level: At Umbilicus


Fundal Placement: Midline


Lochia Amount: Scant


Lochia Color: Rubra/Red


Perineum Description: Other (see below)


Other Perinuem Description: 2nd degree laceration with repair.


Episiotomy/Laceration: Approximated


Bladder Status: Voiding


Urinary Elimination: Voided





- Exam


General: Alert, Oriented, Cooperative, No Acute Distress


Lungs: Normal Respiratory Effort


Cardiovascular: Regular Rate, Regular Rhythm


GI/Abdominal Exam: Soft, Non-Tender


Extremities: Normal Inspection, Normal Range of Motion, Non-Tender, Normal 

Capillary Refill


Skin: Warm, Dry, Intact


Neurological: No New Focal Deficit, Normal Speech, Normal Tone, Strength Equal 

Bilateral, Sensation Intact


Psy/Mental Status: Alert, Normal Affect, Normal Mood





- Problem List & Annotations


(1)  (spontaneous vaginal delivery)


SNOMED Code(s): 543669114


   Code(s): O80 - ENCOUNTER FOR FULL-TERM UNCOMPLICATED DELIVERY   Status: Acute

  Priority: High   Current Visit: Yes   





- Problem List Review


Problem List Initiated/Reviewed/Updated: No





- Plan


Plan:: 





Postpartum Day 1


A:  Ambulating, voiding, and tolerating diet.  Attempted breastfeeding, but 

"nothing came".  No concerns/questions at this time.  Lochia small, no clots.  


P: Routine postpartum plan of care.  Encourage continued attempts at pumping 

and/or breastfeeding.  Education provided regarding milk production and what to 

expect.  Dr. Suh updated.

## 2021-03-15 NOTE — PCM.DCSUM1
**Discharge Summary





- Hospital Course


Free Text/Narrative:: 





Discharge home.  Follow up in the clinic in 6 weeks for routine postpartum 

visit; sooner, if needed. 


Diagnosis: Stroke: No


Modified [place] Scale: No Symptoms at All


Modified [place] Scale Score: 0





- Discharge Data


Discharge Date: 03/15/21


Discharge Disposition: Home, Self-Care 01


Condition: Good





- Referral to Home Health


Primary Care Physician: 


PCP None








- Discharge Diagnosis/Problem(s)


(1)  (spontaneous vaginal delivery)


SNOMED Code(s): 138376321


   ICD Code: O80 - ENCOUNTER FOR FULL-TERM UNCOMPLICATED DELIVERY   Status: 

Acute   Priority: High   Current Visit: Yes   





- Patient Instructions


Diet: Regular Diet as Tolerated, Drink 8-10+ Glasses/Day


Activity: Apply Ice


Driving: Do Not Drive


Showering/Bathing: May Shower


Notify Provider of: Fever, Increased Pain, Swelling and Redness, Drainage, 

Nausea and/or Vomiting





- Discharge Plan


*PRESCRIPTION DRUG MONITORING PROGRAM REVIEWED*: Not Applicable


*COPY OF PRESCRIPTION DRUG MONITORING REPORT IN PATIENT CARLO: Not Applicable


Prescriptions/Med Rec: 


Ibuprofen [Motrin] 800 mg PO Q6H PRN #90 tablet


 PRN Reason: Pain


Home Medications: 


                                    Home Meds





Ibuprofen [Motrin] 800 mg PO Q6H PRN #90 tablet 03/15/21 [Rx]








Oxygen Therapy Mode: Room Air





- Discharge Summary/Plan Comment


DC Time >30 min.: Yes





- General Info


Date of Service: 03/15/21


Functional Status: Reports: Pain Controlled, Tolerating Diet, Ambulating, 

Urinating





- Review of Systems


General: Reports: No Symptoms


HEENT: Reports: No Symptoms


Pulmonary: Reports: No Symptoms


Cardiovascular: Reports: No Symptoms


Gastrointestinal: Reports: No Symptoms


Genitourinary: Reports: No Symptoms


Musculoskeletal: Reports: No Symptoms


Skin: Reports: No Symptoms


Neurological: Reports: No Symptoms


Psychiatric: Reports: No Symptoms





- Patient Data


Vitals - Most Recent: 


                                Last Vital Signs











Temp  98.8 F   21 19:57


 


Pulse  68   21 19:57


 


Resp  18   21 19:57


 


BP  126/68   21 19:57


 


Pulse Ox  97   21 19:57











Weight - Most Recent: 173 lb


Med Orders - Current: 


                               Current Medications





Acetaminophen (Acetaminophen 500 Mg Tab)  500 mg PO Q4H PRN


   PRN Reason: Pain


Acetaminophen (Acetaminophen 500 Mg Tab)  1,000 mg PO Q4H PRN


   PRN Reason: Pain


   Last Admin: 21 06:30 Dose:  1,000 mg


   Documented by: 


Benzocaine/Menthol (Benzocaine/Menthol 20%-0.5% Spray 78 Gm Cannister)  78 gm 

TOP ASDIRECTED PRN


   PRN Reason: Perineal Comfort Measure


   Last Admin: 21 14:31 Dose:  1 bottle


   Documented by: 


Bisacodyl (Bisacodyl 10 Mg Supp)  10 mg RECTAL ONETIME PRN


   PRN Reason: Constipation


Docusate Sodium (Docusate Sodium 100 Mg Cap)  100 mg PO BID PRN


   PRN Reason: Constipation


   Last Admin: 21 08:01 Dose:  100 mg


   Documented by: 


Emollient Ointment (Lanolin 100% Cream 7 Gm Tube)  0 gm TOP ASDIRECTED PRN


   PRN Reason: Sore Nipples


   Last Admin: 21 14:31 Dose:  1 applic


   Documented by: 


Ibuprofen (Ibuprofen 400 Mg Tab)  400 mg PO Q4H PRN


   PRN Reason: Pain


Ibuprofen (Ibuprofen 800 Mg Tab)  800 mg PO Q6H PRN


   PRN Reason: Pain


   Last Admin: 03/15/21 00:37 Dose:  800 mg


   Documented by: 


Oxycodone HCl (Oxycodone 5 Mg Tab)  5 mg PO Q2H PRN


   PRN Reason: Pain


Sodium Chloride (Sodium Chloride 0.9% 10 Ml Syringe)  10 ml FLUSH ASDIRECTED PRN


   PRN Reason: Keep Vein Open


Sodium Chloride (Sodium Chloride 0.9% 2.5 Ml Syringe)  2.5 ml FLUSH ASDIRECTED 

PRN


   PRN Reason: Keep Vein Open


Sodium Chloride (Sodium Chloride 0.9% 10 Ml Sdv)  10 ml IV ASDIRECTED PRN


   PRN Reason: IV Use


Witch Hazel (Witch Hazel Medicated Pads 40/Jar)  1 pad TOP ASDIRECTED PRN


   PRN Reason: comfort care


   Last Admin: 21 14:31 Dose:  1 box


   Documented by: 





Discontinued Medications





Ampicillin Sodium (Ampicillin 1 Gm Vial) Confirm Administered Dose 1 gm .ROUTE 

.STK-MED ONE


   Stop: 21 10:39


   Last Admin: 21 07:19 Dose:  Not Given


   Documented by: 


Butorphanol Tartrate (Butorphanol 1 Mg/Ml Sdv)  1 mg IVPUSH Q1H PRN


   PRN Reason: Pain


Carboprost Tromethamine (Carboprost Tromethamine 250 Mcg/1 Ml Amp)  250 mcg IM 

ASDIRECTED PRN


   PRN Reason: Post Partum Hemorrhage


Fentanyl (Fentanyl 100 Mcg/2 Ml Sdv) Confirm Administered Dose 100 mcg .ROUTE 

.STK-MED ONE


   Stop: 21 03:30


   Last Admin: 21 07:19 Dose:  Not Given


   Documented by: 


Fentanyl (Fentanyl 100 Mcg/2 Ml Sdv) Confirm Administered Dose 100 mcg .ROUTE 

.STK-MED ONE


   Stop: 21 12:20


   Last Admin: 21 07:19 Dose:  Not Given


   Documented by: 


Oxytocin/Sodium Chloride (Oxytocin 30 Unit/500 Ml-Ns)  30 unit in 500 mls @ 999 

mls/hr IV TITRATE [facility]


   Last Admin: 21 12:27 Dose:  500 mls/hr


   Documented by: 


Tranexamic Acid 1,000 mg/ (Sodium Chloride)  110 mls @ 660 mls/hr IV ONETIME PRN


   PRN Reason: Bleeding


Ampicillin Sodium 2 gm/ Sodium (Chloride)  100 mls @ 200 mls/hr IV ONETIME ONE


   Stop: 21 02:37


   Last Admin: 21 03:01 Dose:  200 mls/hr


   Documented by: 


Lactated Ringer's (Ringers, Lactated)  1,000 mls @ 150 mls/hr IV ASDIRECTED [facility]


   Last Admin: 21 08:12 Dose:  150 mls/hr


   Documented by: 


Ampicillin Sodium 1 gm/ Sodium (Chloride)  50 mls @ 100 mls/hr IV Q4H [facility]


   Last Admin: 21 10:53 Dose:  100 mls/hr


   Documented by: 


Ropivacaine (Naropin 0.2%) Confirm Administered Dose 100 mls @ as directed 

.ROUTE .[facility]-MED ONE


   Stop: 21 03:30


   Last Admin: 21 07:19 Dose:  Not Given


   Documented by: 


Sodium Chloride (Normal Saline) Confirm Administered Dose 50 mls @ as directed 

.ROUTE .STK-MED ONE


   Stop: 21 10:39


   Last Admin: 21 07:19 Dose:  Not Given


   Documented by: 


Ropivacaine (Naropin 0.2%) Confirm Administered Dose 100 mls @ as directed 

.ROUTE .STK-MED ONE


   Stop: 21 12:20


   Last Admin: 21 07:19 Dose:  Not Given


   Documented by: 


Lidocaine HCl (Lidocaine 1% 50 Ml Mdv)  50 ml INJECT ONETIME PRN


   PRN Reason: Laceration repair


   Last Admin: 21 13:38 Dose:  50 ml


   Documented by: 


Methylergonovine Maleate (Methylergonovine 0.2 Mg/1 Ml Amp)  0.2 mg IM 

ASDIRECTED PRN


   PRN Reason: Post Partum Hemorrhage


Misoprostol (Misoprostol 200 Mcg Tab)  200 mcg PO ONETIME PRN


   PRN Reason: Post Partum Hemorrhage


Nalbuphine HCl (Nalbuphine 10 Mg/1 Ml Vial)  10 mg IVPUSH Q1H PRN


   PRN Reason: Pain (severe 7-10)


Sterile Water (Water For Irrigation,Sterile 1,000 Ml Container)  1,000 ml IRR 

ASDIRECTED PRN


   PRN Reason: delivery











- Exam


General: Reports: Alert, Oriented, Cooperative, No Acute Distress


Lungs: Reports: Normal Respiratory Effort


Cardiovascular: Reports: Regular Rate, Regular Rhythm


GI/Abdominal Exam: Soft, Non-Tender


 (Female) Exam: Deferred


Rectal (Female) Exam: Deferred


Back Exam: Reports: Normal Inspection, Full Range of Motion


Extremities: Normal Inspection, Normal Range of Motion, Non-Tender, Normal 

Capillary Refill


Skin: Reports: Warm, Dry, Intact


Neurological: Reports: No New Focal Deficit, Normal Speech, Normal Tone, 

Sensation Intact


Psy/Mental Status: Reports: Alert, Normal Affect, Normal Mood